# Patient Record
Sex: FEMALE | Race: BLACK OR AFRICAN AMERICAN | NOT HISPANIC OR LATINO | Employment: FULL TIME | URBAN - METROPOLITAN AREA
[De-identification: names, ages, dates, MRNs, and addresses within clinical notes are randomized per-mention and may not be internally consistent; named-entity substitution may affect disease eponyms.]

---

## 2017-05-06 ENCOUNTER — OFFICE VISIT (OUTPATIENT)
Dept: URGENT CARE | Facility: PHYSICIAN GROUP | Age: 32
End: 2017-05-06
Payer: COMMERCIAL

## 2017-05-06 VITALS
TEMPERATURE: 99 F | BODY MASS INDEX: 23.33 KG/M2 | SYSTOLIC BLOOD PRESSURE: 108 MMHG | OXYGEN SATURATION: 97 % | HEART RATE: 85 BPM | DIASTOLIC BLOOD PRESSURE: 78 MMHG | WEIGHT: 149 LBS | RESPIRATION RATE: 14 BRPM

## 2017-05-06 DIAGNOSIS — H92.02 OTALGIA, LEFT EAR: ICD-10-CM

## 2017-05-06 DIAGNOSIS — H65.192 OTHER ACUTE NONSUPPURATIVE OTITIS MEDIA OF LEFT EAR, RECURRENCE NOT SPECIFIED: ICD-10-CM

## 2017-05-06 DIAGNOSIS — J06.9 VIRAL URI WITH COUGH: ICD-10-CM

## 2017-05-06 PROCEDURE — 99203 OFFICE O/P NEW LOW 30 MIN: CPT | Performed by: NURSE PRACTITIONER

## 2017-05-06 RX ORDER — NORGESTREL AND ETHINYL ESTRADIOL 0.3-0.03MG
KIT ORAL
COMMUNITY
Start: 2017-05-05 | End: 2021-09-30

## 2017-05-06 RX ORDER — AZITHROMYCIN 250 MG/1
TABLET, FILM COATED ORAL
Qty: 6 TAB | Refills: 0 | Status: SHIPPED | OUTPATIENT
Start: 2017-05-06 | End: 2017-10-18

## 2017-05-06 ASSESSMENT — ENCOUNTER SYMPTOMS
RHINORRHEA: 1
CHILLS: 1
SWOLLEN GLANDS: 1
SPUTUM PRODUCTION: 0
COUGH: 1
SINUS PAIN: 1
FEVER: 0
WHEEZING: 0
HEADACHES: 1
SORE THROAT: 1
SHORTNESS OF BREATH: 0

## 2017-05-06 NOTE — PROGRESS NOTES
Subjective:      Chely Pizarro is a 31 y.o. female who presents with Nasal Congestion            URI   This is a new problem. Episode onset: 3 days ago. The problem has been gradually worsening. There has been no fever. Associated symptoms include congestion, coughing, ear pain, headaches, a plugged ear sensation, rhinorrhea, sinus pain, sneezing, a sore throat and swollen glands. Pertinent negatives include no wheezing. She has tried acetaminophen, NSAIDs and sleep for the symptoms. The treatment provided no relief.       Review of Systems   Constitutional: Positive for chills and malaise/fatigue. Negative for fever.   HENT: Positive for congestion, ear pain, rhinorrhea, sneezing and sore throat. Negative for ear discharge.    Respiratory: Positive for cough. Negative for sputum production, shortness of breath and wheezing.    Neurological: Positive for headaches.   All other systems reviewed and are negative.    No past medical history on file.   Past Surgical History   Procedure Laterality Date   • Gyn surgery       labiaplasty      Social History     Social History   • Marital Status:      Spouse Name: N/A   • Number of Children: N/A   • Years of Education: N/A     Occupational History   • Not on file.     Social History Main Topics   • Smoking status: Never Smoker    • Smokeless tobacco: Not on file   • Alcohol Use: No   • Drug Use: No   • Sexual Activity: Not on file     Other Topics Concern   • Not on file     Social History Narrative   • No narrative on file          Objective:     /78 mmHg  Pulse 85  Temp(Src) 37.2 °C (99 °F)  Resp 14  Wt 67.586 kg (149 lb)  SpO2 97%     Physical Exam   Constitutional: She is oriented to person, place, and time. Vital signs are normal. She appears well-developed and well-nourished.   HENT:   Head: Normocephalic and atraumatic.   Right Ear: External ear normal. Tympanic membrane is erythematous (mild).   Left Ear: External ear normal. Tympanic  membrane is injected and erythematous. Tympanic membrane is not perforated.  No middle ear effusion.   Nose: Mucosal edema and rhinorrhea present. Right sinus exhibits frontal sinus tenderness. Left sinus exhibits frontal sinus tenderness.   Mouth/Throat: Oropharynx is clear and moist.   Eyes: EOM are normal. Pupils are equal, round, and reactive to light.   Neck: Trachea normal, normal range of motion and phonation normal.   Cardiovascular: Normal rate and regular rhythm.    Pulmonary/Chest: Effort normal and breath sounds normal.   Musculoskeletal: Normal range of motion.   Lymphadenopathy:        Head (right side): Submandibular adenopathy present.        Head (left side): Submandibular adenopathy present.   Neurological: She is alert and oriented to person, place, and time.   Skin: Skin is warm and dry.   Psychiatric: She has a normal mood and affect. Her speech is normal and behavior is normal. Thought content normal.   Vitals reviewed.              Assessment/Plan:     1. Viral URI with cough    2. Otalgia, left ear  3. Other acute nonsuppurative otitis media of left ear, recurrence not specified  - azithromycin (ZITHROMAX) 250 MG Tab; Take 2 tabs PO on the first day, then one tab PO every day for 4 days  Dispense: 6 Tab; Refill: 0    OTC nasal decongestant  High volume nasal saline rinses  Increase water intake  Tylenol and Ibuprofen PRN fever/pain  Supportive care, differential diagnoses, and indications for immediate follow-up discussed with patient.    Pathogenesis of diagnosis discussed including typical length and natural progression.      Instructed to return to  or nearest emergency department if symptoms fail to improve, for any change in condition, further concerns, or new concerning symptoms.  Patient states understanding of the plan of care and discharge instructions.

## 2017-05-06 NOTE — MR AVS SNAPSHOT
Chely Pizarro   2017 9:45 AM   Office Visit   MRN: [id number]    Department:  [facility]   Dept Phone:  [phone]    Description:  Female : 1985   Provider:  ISABEL Hudson           Reason for Visit     Nasal Congestion congestion, chills, bilateral ear pain x3 days      Allergies as of 2017     Allergen Noted Reactions    Pcn [Penicillins] 2012   Rash      You were diagnosed with     Viral URI with cough   [[id number]]       Otalgia, left ear   [[id number]]       Other acute nonsuppurative otitis media of left ear, recurrence not specified   [[id number]]         Vital Signs     Blood Pressure Pulse Temperature Respirations Weight Oxygen Saturation    108/78 mmHg 85 37.2 °C (99 °F) 14 67.586 kg (149 lb) 97%    Smoking Status                   Never Smoker            Basic Information     Date Of Birth Sex Race Ethnicity Preferred Language    1985 Female Black or  Non- English      Problem List              ICD-10-CM Priority Class Noted - Resolved    Active labor [id number]   3/23/2014 - Present      Health Maintenance        Date Due Completion Dates    PAP SMEAR 2005 ---    IMM DTaP/Tdap/Td Vaccine (2 - Td) 3/23/2024 3/23/2014            Current Immunizations     Tdap Vaccine 3/23/2014  2:45 PM, 3/23/2014  3:03 PM      Below and/or attached are the medications your provider expects you to take. Review all of your home medications and newly ordered medications with your provider and/or pharmacist. Follow medication instructions as directed by your provider and/or pharmacist. Please keep your medication list with you and share with your provider. Update the information when medications are discontinued, doses are changed, or new medications (including over-the-counter products) are added; and carry medication information at all times in the event of emergency situations     Allergies:  PCN - Rash               Medications  Valid as  of: May 06, 2017 - 10:39 AM    Generic Name Brand Name Tablet Size Instructions for use    Azithromycin (Tab) ZITHROMAX 250 MG Take 2 tabs PO on the first day, then one tab PO every day for 4 days        Ketoconazole (Cream) NIZORAL 2 % Apply to affected skin areas bid        Norgestrel-Ethinyl Estradiol (Tab) LOW-OGESTREL 0.3-30 MG-MCG         PredniSONE (Tab) DELTASONE 10 MG Start with 40mg today (4tabs), then taper as directed.        Prenatal Vit-Fe Fumarate-FA (Tab) PRENATAL S 27-0.8 MG Take 1 Tab by mouth every morning.        .                 Medicines prescribed today were sent to:     [facility] [address] [place], [place] - [address] AT [facility] [address] & [address]    [address] [place] [address]    Phone: [phone] Fax: [phone]    Open 24 Hours?: No      Medication refill instructions:       If your prescription bottle indicates you have medication refills left, it is not necessary to call your provider’s office. Please contact your pharmacy and they will refill your medication.    If your prescription bottle indicates you do not have any refills left, you may request refills at any time through one of the following ways: The online [employer] system (except Urgent Care), by calling your provider’s office, or by asking your pharmacy to contact your provider’s office with a refill request. Medication refills are processed only during regular business hours and may not be available until the next business day. Your provider may request additional information or to have a follow-up visit with you prior to refilling your medication.   *Please Note: Medication refills are assigned a new Rx number when refilled electronically. Your pharmacy may indicate that no refills were authorized even though a new prescription for the same medication is available at the pharmacy. Please request the medicine by name with the pharmacy before contacting your provider for a refill.            Take5 Access Code: [email or web address]  Expires: 6/5/2017  9:35 AM    Take5  A secure, online tool to manage your health information     [employer]’s Take5® is a secure, online tool that connects you to your personalized health information from the privacy of your home -- day or night - making it very easy for you to manage your healthcare. Once the activation process is completed, you can even access your medical information using the Take5 daryl, which is available for free in the Apple Daryl store or Google Play store.     Take5 provides the following levels of access (as shown below):   My Chart Features   [facility] Primary Care Doctor [facility]  Specialists [facility]  Urgent  Care Non-[facility]  Primary Care  Doctor   Email your healthcare team securely and privately 24/7 X X X    Manage appointments: schedule your next appointment; view details of past/upcoming appointments X      Request prescription refills. X      View recent personal medical records, including lab and immunizations X X X X   View health record, including health history, allergies, medications X X X X   Read reports about your outpatient visits, procedures, consult and ER notes X X X X   See your discharge summary, which is a recap of your hospital and/or ER visit that includes your diagnosis, lab results, and care plan. X X       How to register for Take5:  1. Go to  [email or web address].  2. Click on the Sign Up Now box, which takes you to the New Member Sign Up page. You will need to provide the following information:  a. Enter your Take5 Access Code exactly as it appears at the top of this page. (You will not need to use this code after you’ve completed the sign-up process. If you do not sign up before the expiration date, you must request a new code.)   b. Enter your date of birth.   c. Enter your home email address.   d. Click Submit, and follow the next screen’s instructions.  3. Create a Take5 ID. This will be your Take5  login ID and cannot be changed, so think of one that is secure and easy to remember.  4. Create a [employer] password. You can change your password at any time.  5. Enter your Password Reset Question and Answer. This can be used at a later time if you forget your password.   6. Enter your e-mail address. This allows you to receive e-mail notifications when new information is available in [employer].  7. Click Sign Up. You can now view your health information.    For assistance activating your [employer] account, call [phone]

## 2017-08-23 ENCOUNTER — APPOINTMENT (OUTPATIENT)
Dept: RADIOLOGY | Facility: MEDICAL CENTER | Age: 32
End: 2017-08-23
Attending: SPECIALIST
Payer: COMMERCIAL

## 2017-08-23 DIAGNOSIS — M54.2 CERVICALGIA: ICD-10-CM

## 2017-08-23 DIAGNOSIS — R20.2 PARESTHESIAS: ICD-10-CM

## 2017-08-23 PROCEDURE — 72141 MRI NECK SPINE W/O DYE: CPT

## 2017-09-22 ENCOUNTER — HOSPITAL ENCOUNTER (OUTPATIENT)
Dept: RADIOLOGY | Facility: MEDICAL CENTER | Age: 32
End: 2017-09-22
Attending: SPECIALIST
Payer: COMMERCIAL

## 2017-09-22 DIAGNOSIS — M54.2 CERVICALGIA: ICD-10-CM

## 2017-09-22 DIAGNOSIS — R20.2 PARESTHESIA: ICD-10-CM

## 2017-09-22 PROCEDURE — 72125 CT NECK SPINE W/O DYE: CPT

## 2017-10-18 ENCOUNTER — OFFICE VISIT (OUTPATIENT)
Dept: MEDICAL GROUP | Facility: PHYSICIAN GROUP | Age: 32
End: 2017-10-18
Payer: COMMERCIAL

## 2017-10-18 VITALS
DIASTOLIC BLOOD PRESSURE: 80 MMHG | WEIGHT: 161.8 LBS | TEMPERATURE: 98.6 F | HEIGHT: 66 IN | BODY MASS INDEX: 26 KG/M2 | OXYGEN SATURATION: 99 % | RESPIRATION RATE: 14 BRPM | SYSTOLIC BLOOD PRESSURE: 112 MMHG | HEART RATE: 78 BPM

## 2017-10-18 DIAGNOSIS — M54.2 NECK PAIN: ICD-10-CM

## 2017-10-18 DIAGNOSIS — R53.83 OTHER FATIGUE: ICD-10-CM

## 2017-10-18 DIAGNOSIS — Z13.6 SCREENING FOR CARDIOVASCULAR, RESPIRATORY, AND GENITOURINARY DISEASES: ICD-10-CM

## 2017-10-18 DIAGNOSIS — Z13.89 SCREENING FOR CARDIOVASCULAR, RESPIRATORY, AND GENITOURINARY DISEASES: ICD-10-CM

## 2017-10-18 DIAGNOSIS — Z13.83 SCREENING FOR CARDIOVASCULAR, RESPIRATORY, AND GENITOURINARY DISEASES: ICD-10-CM

## 2017-10-18 PROCEDURE — 99204 OFFICE O/P NEW MOD 45 MIN: CPT | Performed by: NURSE PRACTITIONER

## 2017-10-18 ASSESSMENT — PATIENT HEALTH QUESTIONNAIRE - PHQ9: CLINICAL INTERPRETATION OF PHQ2 SCORE: 0

## 2017-10-18 NOTE — PROGRESS NOTES
Chief Complaint   Patient presents with   • Establish Care   • Results     MRI/CT    • Orders Needed     Thyroid Labs        HPI:    Chely Pizarro is a 31 y.o. female here to establish care and to discuss the following:    Neck pain  Patient reports neck pain for at least 6 months predominantly on the right side with numbness in the right ear. She's previously been seen by ENT who ordered a CT scan and MRI which are inconsistent. MRI reveals several bulging disks with no foraminal stenosis. She is requesting further evaluation by neurology.    Fatigue  Patient is requesting to check her thyroid because she has fatigue and neck pain.She  denies heart palpitations, weight gain, cold intolerance, depression, dry skin, hair loss, constipation, weakness, headache, lethargy or panic .      Current medicines (including changes today)  Current Outpatient Prescriptions   Medication Sig Dispense Refill   • LOW-OGESTREL 0.3-30 MG-MCG Tab        No current facility-administered medications for this visit.      She  has no past medical history on file.  She  has a past surgical history that includes gyn surgery.  Social History   Substance Use Topics   • Smoking status: Never Smoker   • Smokeless tobacco: Never Used   • Alcohol use 2.4 oz/week     4 Glasses of wine per week     Social History     Social History Narrative   • No narrative on file     History reviewed. No pertinent family history.  No family status information on file.         ROS    Review of Systems   Constitutional: Negative for fever, chills, weight loss and malaise/fatigue.   HENT: Negative for ear pain, nosebleeds, congestion, sore throat.    Eyes: Negative for blurred vision.   Respiratory: Negative for cough, sputum production, shortness of breath and wheezing.    Cardiovascular: Negative for chest pain, palpitations,  and leg swelling.   Gastrointestinal: Negative for heartburn, nausea, vomiting, diarrhea and abdominal pain.   Genitourinary:  "Negative for dysuria, urgency and frequency.   Musculoskeletal: Negative for myalgias, back pain and joint pain. positive for neck pain  Skin: Negative for rash and itching.   Neurological: Negative for dizziness, tingling, tremors, sensory change, focal weakness and headaches.   Endo/Heme/Allergies: Does not bruise/bleed easily.   Psychiatric/Behavioral: Negative for depression, anxiety, suicidal ideas, insomnia and memory loss.      All other systems reviewed and are negative except as in HPI.    Health Maintenance:  Pap smear: due for pap  Immunizations:           Objective:     Blood pressure 112/80, pulse 78, temperature 37 °C (98.6 °F), resp. rate 14, height 1.676 m (5' 6\"), weight 73.4 kg (161 lb 12.8 oz), last menstrual period 10/12/2017, SpO2 99 %, not currently breastfeeding. Body mass index is 26.12 kg/m².  Physical Exam:  Constitutional: Alert, no distress.  Skin: Warm, dry, good turgor, no rashes in visible areas.  Eye: Equal, round and reactive, conjunctiva clear, lids normal.  ENMT: Lips without lesions, good dentition, oropharynx clear. Ear canals are clear, TMs within normal limits bilaterally.   Neck: Trachea midline, no masses, no thyromegaly. No cervical or supraclavicular lymphadenopathy.  Respiratory: Unlabored respiratory effort, lungs clear to auscultation, no wheezes, no ronchi.  Cardiovascular: Normal S1, S2, no murmur, no edema.  Abdomen: Soft, non-tender, no masses, no hepatosplenomegaly.  Psych: Alert and oriented x3, normal affect and mood.  MS: Ambulates independently with steady gait.  Has full range of motion of all extremities and spine.  Neuro: Cranial nerves intact. DTRs within normal limits. No neurological deficits.    Assessment and Plan:   The following treatment plan was discussed   1. Neck pain  REFERRAL TO NEUROSURGERY    ref to neurosurgery for further evaluation   2. Other fatigue  CBC WITH DIFFERENTIAL    TSH WITH REFLEX TO FT4    labs ordered   3. Screening for " cardiovascular, respiratory, and genitourinary diseases  COMP METABOLIC PANEL    LIPID PROFILE     labs ordered     Followup: Return in about 4 weeks (around 11/15/2017) for PAP.   Please note that this dictation was created using voice recognition software. I have made every reasonable attempt to correct obvious errors, but I expect that there are errors of grammar and possibly content that I did not discover before finalizing the note.

## 2017-10-18 NOTE — ASSESSMENT & PLAN NOTE
Patient reports neck pain for at least 6 months predominantly on the right side with numbness in the right ear. She's previously been seen by ENT who ordered a CT scan and MRI which are inconsistent. MRI reveals several bulging disks with no foraminal stenosis. She is requesting further evaluation by neurology.

## 2017-10-23 NOTE — ASSESSMENT & PLAN NOTE
Patient is requesting to check her thyroid because she has fatigue and neck pain.She  denies heart palpitations, weight gain, cold intolerance, depression, dry skin, hair loss, constipation, weakness, headache, lethargy or panic .

## 2017-11-14 ENCOUNTER — TELEPHONE (OUTPATIENT)
Dept: MEDICAL GROUP | Facility: PHYSICIAN GROUP | Age: 32
End: 2017-11-14

## 2017-11-14 ENCOUNTER — HOSPITAL ENCOUNTER (OUTPATIENT)
Facility: MEDICAL CENTER | Age: 32
End: 2017-11-14
Attending: NURSE PRACTITIONER
Payer: COMMERCIAL

## 2017-11-14 ENCOUNTER — OFFICE VISIT (OUTPATIENT)
Dept: MEDICAL GROUP | Facility: PHYSICIAN GROUP | Age: 32
End: 2017-11-14
Payer: COMMERCIAL

## 2017-11-14 VITALS
DIASTOLIC BLOOD PRESSURE: 82 MMHG | TEMPERATURE: 98.4 F | SYSTOLIC BLOOD PRESSURE: 132 MMHG | OXYGEN SATURATION: 100 % | HEART RATE: 84 BPM | WEIGHT: 161 LBS | HEIGHT: 66 IN | BODY MASS INDEX: 25.88 KG/M2 | RESPIRATION RATE: 16 BRPM

## 2017-11-14 DIAGNOSIS — Z00.00 ROUTINE HEALTH MAINTENANCE: ICD-10-CM

## 2017-11-14 DIAGNOSIS — Z12.4 ENCOUNTER FOR PAPANICOLAOU SMEAR OF CERVIX: ICD-10-CM

## 2017-11-14 DIAGNOSIS — Z34.90 PREGNANCY, UNSPECIFIED GESTATIONAL AGE: ICD-10-CM

## 2017-11-14 PROCEDURE — 87624 HPV HI-RISK TYP POOLED RSLT: CPT

## 2017-11-14 PROCEDURE — 99395 PREV VISIT EST AGE 18-39: CPT | Performed by: NURSE PRACTITIONER

## 2017-11-14 PROCEDURE — 88175 CYTOPATH C/V AUTO FLUID REDO: CPT

## 2017-11-14 RX ORDER — VITAMIN A ACETATE, BETA CAROTENE, ASCORBIC ACID, CHOLECALCIFEROL, .ALPHA.-TOCOPHEROL ACETATE, DL-, THIAMINE MONONITRATE, RIBOFLAVIN, NIACINAMIDE, PYRIDOXINE HYDROCHLORIDE, FOLIC ACID, CYANOCOBALAMIN, CALCIUM CARBONATE, FERROUS FUMARATE, ZINC OXIDE, CUPRIC OXIDE 3080; 12; 120; 400; 1; 1.84; 3; 20; 22; 920; 25; 200; 27; 10; 2 [IU]/1; UG/1; MG/1; [IU]/1; MG/1; MG/1; MG/1; MG/1; MG/1; [IU]/1; MG/1; MG/1; MG/1; MG/1; MG/1
1 TABLET, FILM COATED ORAL EVERY MORNING
Qty: 90 TAB | Refills: 3 | Status: CANCELLED | OUTPATIENT
Start: 2017-11-14

## 2017-11-14 RX ORDER — VITAMIN A ACETATE, BETA CAROTENE, ASCORBIC ACID, CHOLECALCIFEROL, .ALPHA.-TOCOPHEROL ACETATE, DL-, THIAMINE MONONITRATE, RIBOFLAVIN, NIACINAMIDE, PYRIDOXINE HYDROCHLORIDE, FOLIC ACID, CYANOCOBALAMIN, CALCIUM CARBONATE, FERROUS FUMARATE, ZINC OXIDE, CUPRIC OXIDE 3080; 12; 120; 400; 1; 1.84; 3; 20; 22; 920; 25; 200; 27; 10; 2 [IU]/1; UG/1; MG/1; [IU]/1; MG/1; MG/1; MG/1; MG/1; MG/1; [IU]/1; MG/1; MG/1; MG/1; MG/1; MG/1
1 TABLET, FILM COATED ORAL EVERY MORNING
Qty: 90 TAB | Refills: 3 | Status: SHIPPED | OUTPATIENT
Start: 2017-11-14 | End: 2020-02-15

## 2017-11-14 NOTE — TELEPHONE ENCOUNTER
1. Caller Name: Chely Pizarro                                           Call Back Number: [phone] (home)         Patient approves a detailed voicemail message: N\A    Pt states she was just seen and found out she is pregnant again.  She states with 1st 2 pregnancies she had to use progesterone suppositories for the 1 trimester and is asking ir rx can be sent to pharmacy.  Not in med list.

## 2017-11-14 NOTE — PROGRESS NOTES
"SUBJECTIVE: 32 y.o. female for annual routine gynecologic exam  Chief Complaint   Patient presents with   • Gynecologic Exam       Obstetric History     No data available   LMP 10/12/2017  Last Pap:   History   Sexual Activity   • Sexual activity: Yes   • Partners: Male   • Birth control/ protection: Pill     Sexual history: currently sexually active   H/O Abnormal Pap yes in 2007 Abnormal  With HPV  She  reports that she has never smoked. She has never used smokeless tobacco.        Allergies: Pcn [penicillins]     ROS:    Menses every month with irregular PCOS  No significant bloating/fluid retention, pelvic pain, or dyspareunia. No vaginal discharge   No breast tenderness, mass, nipple discharge, changes in size or contour, or abnormal cyclic discomfort.  No urinary tract symptoms, no incontinence, no polydipsia, polyuria,  No abdominal pain, change in bowel habits, black or bloody stools.    No unusual headaches, no visual changes, menstrual migraines   No prolonged cough. No dyspnea or chest pain on exertion.  No depression, labile mood, anxiety, libido changes, insomnia.  No temperature intolerance.  No new/concerning skin lesions, concerns.     Exercise: no regular exercise program  Preventive Care Influenza vaccine    Current medicines (including changes today)  Current Outpatient Prescriptions   Medication Sig Dispense Refill   • PANTOPRAZOLE SODIUM PO Take  by mouth.     • prenatal plus vitamin (STUARTNATAL 1+1) 27-1 MG Tab tablet Take 1 Tab by mouth every morning. 90 Tab 3   • LOW-OGESTREL 0.3-30 MG-MCG Tab        No current facility-administered medications for this visit.      She  has no past medical history on file.  She  has a past surgical history that includes gyn surgery.     Family History: History reviewed. No pertinent family history.    OBJECTIVE:   /82   Pulse 84   Temp 36.9 °C (98.4 °F)   Resp 16   Ht 1.676 m (5' 6\")   Wt 73 kg (161 lb)   SpO2 100%   Breastfeeding? No   BMI " 25.99 kg/m²   Body mass index is 25.99 kg/m².    HEAD AND NECK:  Ears normal.  Throat, oral cavity and tongue normal.  Neck supple. No adenopathy or masses in the neck or supraclavicular regions.  No carotid bruits. No thyromegaly. NEURO: Cranial nerves are normal. DTR's normal and symmetric.  CHEST:  Clear, good air entry, no wheezes or rales. HEART:  Regular rate and rhythm.  S1 and S2 normal.   No edema or JVD. ABDOMEN:  Soft without tenderness, guarding, mass or organomegaly.  No CVA tenderness or inguinal adenopathy. EXTREMITIES:  Extremities, reflexes and peripheral pulses are normal. SKIN: color normal, vascularity normal, no edema, temperature normal   No rashes or suspicious skin lesions noted.     Breast Exam: Performed with instruction during examination. No axillary lymphadenopathy, no skin changes, no dominant masses. No nipple retraction, tenderness to palpation bilaterally  Pelvic Exam -  Normal external genitalia with no lesions. Normal vaginal mucosa with normal rugation and no discharge. Cervix with no visible lesions. No cervical motion tenderness. Uterus is normal sized with no masses. No adnexal tenderness or enlargement appreciated. Thin Prep Pap is obtained, vaginal swab is not obtained and specimen(s) sent to lab  Rectal: deferred    <ASSESSMENT and PLAN>  1. Pregnancy, unspecified gestational age  REFERRAL TO OB/GYN    prenatal plus vitamin (STUARTNATAL 1+1) 27-1 MG Tab tablet    POCT Pregnancy   2. Routine health maintenance     3. Encounter for Papanicolaou smear of cervix  THINPREP PAP WITH HPV    Urine pregnancy is positive    Discussed  family planning choices   Follow-up in 1 years for next Gyn exam and 3 years for next Pap.   Next office visit for recheck of chronic medical conditions is due as needed

## 2017-11-15 DIAGNOSIS — Z34.90 PREGNANCY, UNSPECIFIED GESTATIONAL AGE: ICD-10-CM

## 2017-11-15 LAB
CYTOLOGY REG CYTOL: NORMAL
HPV HR 12 DNA CVX QL NAA+PROBE: NEGATIVE
HPV16 DNA SPEC QL NAA+PROBE: NEGATIVE
HPV18 DNA SPEC QL NAA+PROBE: NEGATIVE
SPECIMEN SOURCE: NORMAL

## 2017-11-15 NOTE — TELEPHONE ENCOUNTER
Pt states she has had a past miscarriage; due to the fact she had POCS with her pregnancies and does not make the correct progesterone hormone. She has to take the suppositories for the first trimester.  Please Advise

## 2017-11-16 ENCOUNTER — TELEPHONE (OUTPATIENT)
Dept: MEDICAL GROUP | Facility: PHYSICIAN GROUP | Age: 32
End: 2017-11-16

## 2017-11-16 NOTE — TELEPHONE ENCOUNTER
----- Message from ISABEL Buchanan sent at 11/15/2017  9:00 PM PST -----  Please call the patient and tell them their Pap smear results are normal  This means that no cancerous or precancerous cells were seen.  It is recommended that they get an annual well woman exam in one year and a routine Pap smear in 3 years.

## 2018-01-15 ENCOUNTER — HOSPITAL ENCOUNTER (OUTPATIENT)
Dept: LAB | Facility: MEDICAL CENTER | Age: 33
End: 2018-01-15
Attending: OBSTETRICS & GYNECOLOGY
Payer: COMMERCIAL

## 2018-01-15 LAB
APPEARANCE UR: CLEAR
BASOPHILS # BLD AUTO: 0.3 % (ref 0–1.8)
BASOPHILS # BLD: 0.02 K/UL (ref 0–0.12)
BILIRUB UR QL STRIP.AUTO: NEGATIVE
COLOR UR: YELLOW
CULTURE IF INDICATED INDCX: NO UA CULTURE
EOSINOPHIL # BLD AUTO: 0.03 K/UL (ref 0–0.51)
EOSINOPHIL NFR BLD: 0.4 % (ref 0–6.9)
ERYTHROCYTE [DISTWIDTH] IN BLOOD BY AUTOMATED COUNT: 37.4 FL (ref 35.9–50)
GLUCOSE UR STRIP.AUTO-MCNC: NEGATIVE MG/DL
HBV SURFACE AG SER QL: NEGATIVE
HCT VFR BLD AUTO: 39.6 % (ref 37–47)
HGB BLD-MCNC: 13.9 G/DL (ref 12–16)
HIV 1+2 AB+HIV1 P24 AG SERPL QL IA: NON REACTIVE
IMM GRANULOCYTES # BLD AUTO: 0.02 K/UL (ref 0–0.11)
IMM GRANULOCYTES NFR BLD AUTO: 0.3 % (ref 0–0.9)
KETONES UR STRIP.AUTO-MCNC: NEGATIVE MG/DL
LEUKOCYTE ESTERASE UR QL STRIP.AUTO: NEGATIVE
LYMPHOCYTES # BLD AUTO: 1.54 K/UL (ref 1–4.8)
LYMPHOCYTES NFR BLD: 21.6 % (ref 22–41)
MCH RBC QN AUTO: 31.1 PG (ref 27–33)
MCHC RBC AUTO-ENTMCNC: 35.1 G/DL (ref 33.6–35)
MCV RBC AUTO: 88.6 FL (ref 81.4–97.8)
MICRO URNS: NORMAL
MONOCYTES # BLD AUTO: 0.35 K/UL (ref 0–0.85)
MONOCYTES NFR BLD AUTO: 4.9 % (ref 0–13.4)
NEUTROPHILS # BLD AUTO: 5.17 K/UL (ref 2–7.15)
NEUTROPHILS NFR BLD: 72.5 % (ref 44–72)
NITRITE UR QL STRIP.AUTO: NEGATIVE
NRBC # BLD AUTO: 0 K/UL
NRBC BLD-RTO: 0 /100 WBC
PH UR STRIP.AUTO: 5.5 [PH]
PLATELET # BLD AUTO: 226 K/UL (ref 164–446)
PMV BLD AUTO: 10.8 FL (ref 9–12.9)
PROT UR QL STRIP: NEGATIVE MG/DL
RBC # BLD AUTO: 4.47 M/UL (ref 4.2–5.4)
RBC UR QL AUTO: NEGATIVE
RUBV AB SER QL: 41 IU/ML
SP GR UR STRIP.AUTO: 1.02
TREPONEMA PALLIDUM IGG+IGM AB [PRESENCE] IN SERUM OR PLASMA BY IMMUNOASSAY: NON REACTIVE
UROBILINOGEN UR STRIP.AUTO-MCNC: 0.2 MG/DL
WBC # BLD AUTO: 7.1 K/UL (ref 4.8–10.8)

## 2018-01-15 PROCEDURE — 86900 BLOOD TYPING SEROLOGIC ABO: CPT

## 2018-01-15 PROCEDURE — 87340 HEPATITIS B SURFACE AG IA: CPT

## 2018-01-15 PROCEDURE — 87389 HIV-1 AG W/HIV-1&-2 AB AG IA: CPT

## 2018-01-15 PROCEDURE — 86762 RUBELLA ANTIBODY: CPT

## 2018-01-15 PROCEDURE — 86850 RBC ANTIBODY SCREEN: CPT

## 2018-01-15 PROCEDURE — 86901 BLOOD TYPING SEROLOGIC RH(D): CPT

## 2018-01-15 PROCEDURE — 81003 URINALYSIS AUTO W/O SCOPE: CPT

## 2018-01-15 PROCEDURE — 81508 FTL CGEN ABNOR TWO PROTEINS: CPT

## 2018-01-15 PROCEDURE — 36415 COLL VENOUS BLD VENIPUNCTURE: CPT

## 2018-01-15 PROCEDURE — 86780 TREPONEMA PALLIDUM: CPT

## 2018-01-15 PROCEDURE — 85025 COMPLETE CBC W/AUTO DIFF WBC: CPT

## 2018-01-16 LAB
ABO GROUP BLD: NORMAL
BLD GP AB SCN SERPL QL: NORMAL
RH BLD: NORMAL

## 2018-01-16 PROCEDURE — 86901 BLOOD TYPING SEROLOGIC RH(D): CPT

## 2018-01-16 PROCEDURE — 86900 BLOOD TYPING SEROLOGIC ABO: CPT

## 2018-01-16 PROCEDURE — 86850 RBC ANTIBODY SCREEN: CPT

## 2018-01-17 LAB — TEST NAME 95000: NORMAL

## 2018-02-10 ENCOUNTER — HOSPITAL ENCOUNTER (OUTPATIENT)
Dept: LAB | Facility: MEDICAL CENTER | Age: 33
End: 2018-02-10
Attending: OBSTETRICS & GYNECOLOGY
Payer: COMMERCIAL

## 2018-02-10 PROCEDURE — 82105 ALPHA-FETOPROTEIN SERUM: CPT

## 2018-02-10 PROCEDURE — 36415 COLL VENOUS BLD VENIPUNCTURE: CPT

## 2018-02-13 LAB
# FETUSES US: NORMAL
AFP MOM SERPL: 0.73
AFP SERPL-MCNC: 29 NG/ML
AGE - REPORTED: 33.6 YR
GA METHOD: NORMAL
GA: 17.29 WEEKS
IDDM PATIENT QL: NO
INTEGRATED SCN PATIENT-IMP: NORMAL

## 2018-05-09 ENCOUNTER — HOSPITAL ENCOUNTER (OUTPATIENT)
Dept: LAB | Facility: MEDICAL CENTER | Age: 33
End: 2018-05-09
Attending: NURSE PRACTITIONER
Payer: COMMERCIAL

## 2018-05-09 ENCOUNTER — HOSPITAL ENCOUNTER (OUTPATIENT)
Dept: LAB | Facility: MEDICAL CENTER | Age: 33
End: 2018-05-09
Attending: OBSTETRICS & GYNECOLOGY
Payer: COMMERCIAL

## 2018-05-09 DIAGNOSIS — Z13.6 SCREENING FOR CARDIOVASCULAR, RESPIRATORY, AND GENITOURINARY DISEASES: ICD-10-CM

## 2018-05-09 DIAGNOSIS — Z13.89 SCREENING FOR CARDIOVASCULAR, RESPIRATORY, AND GENITOURINARY DISEASES: ICD-10-CM

## 2018-05-09 DIAGNOSIS — Z13.83 SCREENING FOR CARDIOVASCULAR, RESPIRATORY, AND GENITOURINARY DISEASES: ICD-10-CM

## 2018-05-09 DIAGNOSIS — R53.83 OTHER FATIGUE: ICD-10-CM

## 2018-05-09 LAB
ALBUMIN SERPL BCP-MCNC: 3.4 G/DL (ref 3.2–4.9)
ALBUMIN/GLOB SERPL: 1.2 G/DL
ALP SERPL-CCNC: 56 U/L (ref 30–99)
ALT SERPL-CCNC: 9 U/L (ref 2–50)
ANION GAP SERPL CALC-SCNC: 7 MMOL/L (ref 0–11.9)
AST SERPL-CCNC: 13 U/L (ref 12–45)
BASOPHILS # BLD AUTO: 0.4 % (ref 0–1.8)
BASOPHILS # BLD: 0.03 K/UL (ref 0–0.12)
BILIRUB SERPL-MCNC: 0.3 MG/DL (ref 0.1–1.5)
BUN SERPL-MCNC: 6 MG/DL (ref 8–22)
CALCIUM SERPL-MCNC: 8.7 MG/DL (ref 8.5–10.5)
CHLORIDE SERPL-SCNC: 107 MMOL/L (ref 96–112)
CHOLEST SERPL-MCNC: 232 MG/DL (ref 100–199)
CO2 SERPL-SCNC: 22 MMOL/L (ref 20–33)
CREAT SERPL-MCNC: 0.53 MG/DL (ref 0.5–1.4)
EOSINOPHIL # BLD AUTO: 0.02 K/UL (ref 0–0.51)
EOSINOPHIL NFR BLD: 0.2 % (ref 0–6.9)
ERYTHROCYTE [DISTWIDTH] IN BLOOD BY AUTOMATED COUNT: 41.8 FL (ref 35.9–50)
GLOBULIN SER CALC-MCNC: 2.9 G/DL (ref 1.9–3.5)
GLUCOSE 1H P 50 G GLC PO SERPL-MCNC: 120 MG/DL (ref 70–139)
GLUCOSE SERPL-MCNC: 73 MG/DL (ref 65–99)
HCT VFR BLD AUTO: 37.6 % (ref 37–47)
HDLC SERPL-MCNC: 60 MG/DL
HGB BLD-MCNC: 12.7 G/DL (ref 12–16)
IMM GRANULOCYTES # BLD AUTO: 0.06 K/UL (ref 0–0.11)
IMM GRANULOCYTES NFR BLD AUTO: 0.7 % (ref 0–0.9)
LDLC SERPL CALC-MCNC: 123 MG/DL
LYMPHOCYTES # BLD AUTO: 1.69 K/UL (ref 1–4.8)
LYMPHOCYTES NFR BLD: 20.6 % (ref 22–41)
MCH RBC QN AUTO: 30.4 PG (ref 27–33)
MCHC RBC AUTO-ENTMCNC: 33.8 G/DL (ref 33.6–35)
MCV RBC AUTO: 90 FL (ref 81.4–97.8)
MONOCYTES # BLD AUTO: 0.44 K/UL (ref 0–0.85)
MONOCYTES NFR BLD AUTO: 5.4 % (ref 0–13.4)
NEUTROPHILS # BLD AUTO: 5.96 K/UL (ref 2–7.15)
NEUTROPHILS NFR BLD: 72.7 % (ref 44–72)
NRBC # BLD AUTO: 0 K/UL
NRBC BLD-RTO: 0 /100 WBC
PLATELET # BLD AUTO: 174 K/UL (ref 164–446)
PMV BLD AUTO: 10.2 FL (ref 9–12.9)
POTASSIUM SERPL-SCNC: 3.8 MMOL/L (ref 3.6–5.5)
PROT SERPL-MCNC: 6.3 G/DL (ref 6–8.2)
RBC # BLD AUTO: 4.18 M/UL (ref 4.2–5.4)
SODIUM SERPL-SCNC: 136 MMOL/L (ref 135–145)
TRIGL SERPL-MCNC: 243 MG/DL (ref 0–149)
TSH SERPL DL<=0.005 MIU/L-ACNC: 1.03 UIU/ML (ref 0.38–5.33)
WBC # BLD AUTO: 8.2 K/UL (ref 4.8–10.8)

## 2018-05-09 PROCEDURE — 36415 COLL VENOUS BLD VENIPUNCTURE: CPT

## 2018-05-09 PROCEDURE — 82950 GLUCOSE TEST: CPT

## 2018-05-09 PROCEDURE — 85025 COMPLETE CBC W/AUTO DIFF WBC: CPT

## 2018-05-09 PROCEDURE — 84443 ASSAY THYROID STIM HORMONE: CPT

## 2018-05-09 PROCEDURE — 80053 COMPREHEN METABOLIC PANEL: CPT

## 2018-05-09 PROCEDURE — 80061 LIPID PANEL: CPT

## 2018-05-10 ENCOUNTER — TELEPHONE (OUTPATIENT)
Dept: MEDICAL GROUP | Facility: PHYSICIAN GROUP | Age: 33
End: 2018-05-10

## 2018-05-10 NOTE — TELEPHONE ENCOUNTER
----- Message from LINDA Ludwig sent at 5/10/2018 12:27 PM PDT -----  Please call patient and let her know that she needs to schedule an appointment to go over her lab results.  It also looks like she needs to establish care with another provider...    Thank you!  Arturo

## 2018-07-10 ENCOUNTER — HOSPITAL ENCOUNTER (INPATIENT)
Facility: MEDICAL CENTER | Age: 33
LOS: 2 days | End: 2018-07-12
Attending: OBSTETRICS & GYNECOLOGY | Admitting: OBSTETRICS & GYNECOLOGY
Payer: COMMERCIAL

## 2018-07-10 LAB
APPEARANCE UR: ABNORMAL
BASOPHILS # BLD AUTO: 0.1 % (ref 0–1.8)
BASOPHILS # BLD: 0.01 K/UL (ref 0–0.12)
COLOR UR AUTO: YELLOW
EOSINOPHIL # BLD AUTO: 0.02 K/UL (ref 0–0.51)
EOSINOPHIL NFR BLD: 0.2 % (ref 0–6.9)
ERYTHROCYTE [DISTWIDTH] IN BLOOD BY AUTOMATED COUNT: 39.6 FL (ref 35.9–50)
GLUCOSE UR QL STRIP.AUTO: 500 MG/DL
HCT VFR BLD AUTO: 35.7 % (ref 37–47)
HGB BLD-MCNC: 12.5 G/DL (ref 12–16)
HOLDING TUBE BB 8507: NORMAL
IMM GRANULOCYTES # BLD AUTO: 0.05 K/UL (ref 0–0.11)
IMM GRANULOCYTES NFR BLD AUTO: 0.6 % (ref 0–0.9)
KETONES UR QL STRIP.AUTO: 15 MG/DL
LEUKOCYTE ESTERASE UR QL STRIP.AUTO: ABNORMAL
LYMPHOCYTES # BLD AUTO: 1.84 K/UL (ref 1–4.8)
LYMPHOCYTES NFR BLD: 20.5 % (ref 22–41)
MCH RBC QN AUTO: 30.3 PG (ref 27–33)
MCHC RBC AUTO-ENTMCNC: 35 G/DL (ref 33.6–35)
MCV RBC AUTO: 86.7 FL (ref 81.4–97.8)
MONOCYTES # BLD AUTO: 0.56 K/UL (ref 0–0.85)
MONOCYTES NFR BLD AUTO: 6.3 % (ref 0–13.4)
NEUTROPHILS # BLD AUTO: 6.48 K/UL (ref 2–7.15)
NEUTROPHILS NFR BLD: 72.3 % (ref 44–72)
NITRITE UR QL STRIP.AUTO: NEGATIVE
NRBC # BLD AUTO: 0 K/UL
NRBC BLD-RTO: 0 /100 WBC
PH UR STRIP.AUTO: 5.5 [PH]
PLATELET # BLD AUTO: 188 K/UL (ref 164–446)
PMV BLD AUTO: 10.9 FL (ref 9–12.9)
PROT UR QL STRIP: 30 MG/DL
RBC # BLD AUTO: 4.12 M/UL (ref 4.2–5.4)
RBC UR QL AUTO: ABNORMAL
SP GR UR: >=1.03
WBC # BLD AUTO: 9 K/UL (ref 4.8–10.8)

## 2018-07-10 PROCEDURE — 770002 HCHG ROOM/CARE - OB PRIVATE (112)

## 2018-07-10 PROCEDURE — 700105 HCHG RX REV CODE 258: Performed by: OBSTETRICS & GYNECOLOGY

## 2018-07-10 PROCEDURE — 700101 HCHG RX REV CODE 250: Performed by: OBSTETRICS & GYNECOLOGY

## 2018-07-10 PROCEDURE — 81002 URINALYSIS NONAUTO W/O SCOPE: CPT

## 2018-07-10 PROCEDURE — 85025 COMPLETE CBC W/AUTO DIFF WBC: CPT

## 2018-07-10 RX ORDER — SODIUM CHLORIDE, SODIUM LACTATE, POTASSIUM CHLORIDE, CALCIUM CHLORIDE 600; 310; 30; 20 MG/100ML; MG/100ML; MG/100ML; MG/100ML
INJECTION, SOLUTION INTRAVENOUS
Status: COMPLETED
Start: 2018-07-10 | End: 2018-07-10

## 2018-07-10 RX ORDER — TERBUTALINE SULFATE 1 MG/ML
0.25 INJECTION, SOLUTION SUBCUTANEOUS PRN
Status: DISCONTINUED | OUTPATIENT
Start: 2018-07-10 | End: 2018-07-11 | Stop reason: HOSPADM

## 2018-07-10 RX ORDER — MISOPROSTOL 200 UG/1
800 TABLET ORAL
Status: DISCONTINUED | OUTPATIENT
Start: 2018-07-10 | End: 2018-07-11 | Stop reason: HOSPADM

## 2018-07-10 RX ORDER — SODIUM CHLORIDE, SODIUM LACTATE, POTASSIUM CHLORIDE, CALCIUM CHLORIDE 600; 310; 30; 20 MG/100ML; MG/100ML; MG/100ML; MG/100ML
INJECTION, SOLUTION INTRAVENOUS CONTINUOUS
Status: DISPENSED | OUTPATIENT
Start: 2018-07-10 | End: 2018-07-11

## 2018-07-10 RX ADMIN — DINOPROSTONE 5 MG: 20 SUPPOSITORY VAGINAL at 23:34

## 2018-07-10 RX ADMIN — SODIUM CHLORIDE, POTASSIUM CHLORIDE, SODIUM LACTATE AND CALCIUM CHLORIDE: 600; 310; 30; 20 INJECTION, SOLUTION INTRAVENOUS at 23:00

## 2018-07-10 ASSESSMENT — COPD QUESTIONNAIRES
IN THE PAST 12 MONTHS DO YOU DO LESS THAN YOU USED TO BECAUSE OF YOUR BREATHING PROBLEMS: DISAGREE/UNSURE
HAVE YOU SMOKED AT LEAST 100 CIGARETTES IN YOUR ENTIRE LIFE: NO/DON'T KNOW
DO YOU EVER COUGH UP ANY MUCUS OR PHLEGM?: NO/ONLY WITH OCCASIONAL COLDS OR INFECTIONS
DURING THE PAST 4 WEEKS HOW MUCH DID YOU FEEL SHORT OF BREATH: NONE/LITTLE OF THE TIME

## 2018-07-10 ASSESSMENT — PATIENT HEALTH QUESTIONNAIRE - PHQ9
1. LITTLE INTEREST OR PLEASURE IN DOING THINGS: NOT AT ALL
SUM OF ALL RESPONSES TO PHQ9 QUESTIONS 1 AND 2: 0
2. FEELING DOWN, DEPRESSED, IRRITABLE, OR HOPELESS: NOT AT ALL

## 2018-07-10 ASSESSMENT — LIFESTYLE VARIABLES
ALCOHOL_USE: NO
EVER_SMOKED: NEVER

## 2018-07-11 LAB
ERYTHROCYTE [DISTWIDTH] IN BLOOD BY AUTOMATED COUNT: 41.2 FL (ref 35.9–50)
HCT VFR BLD AUTO: 34.8 % (ref 37–47)
HGB BLD-MCNC: 12 G/DL (ref 12–16)
MCH RBC QN AUTO: 30.5 PG (ref 27–33)
MCHC RBC AUTO-ENTMCNC: 34.5 G/DL (ref 33.6–35)
MCV RBC AUTO: 88.3 FL (ref 81.4–97.8)
PLATELET # BLD AUTO: 157 K/UL (ref 164–446)
PMV BLD AUTO: 10.8 FL (ref 9–12.9)
RBC # BLD AUTO: 3.94 M/UL (ref 4.2–5.4)
WBC # BLD AUTO: 11.1 K/UL (ref 4.8–10.8)

## 2018-07-11 PROCEDURE — 36415 COLL VENOUS BLD VENIPUNCTURE: CPT

## 2018-07-11 PROCEDURE — A9270 NON-COVERED ITEM OR SERVICE: HCPCS | Performed by: OBSTETRICS & GYNECOLOGY

## 2018-07-11 PROCEDURE — 85027 COMPLETE CBC AUTOMATED: CPT

## 2018-07-11 PROCEDURE — 700102 HCHG RX REV CODE 250 W/ 637 OVERRIDE(OP): Performed by: OBSTETRICS & GYNECOLOGY

## 2018-07-11 PROCEDURE — 303615 HCHG EPIDURAL/SPINAL ANESTHESIA FOR LABOR

## 2018-07-11 PROCEDURE — 700105 HCHG RX REV CODE 258: Performed by: OBSTETRICS & GYNECOLOGY

## 2018-07-11 PROCEDURE — 700111 HCHG RX REV CODE 636 W/ 250 OVERRIDE (IP): Performed by: ANESTHESIOLOGY

## 2018-07-11 PROCEDURE — 700111 HCHG RX REV CODE 636 W/ 250 OVERRIDE (IP): Performed by: OBSTETRICS & GYNECOLOGY

## 2018-07-11 PROCEDURE — 304965 HCHG RECOVERY SERVICES

## 2018-07-11 PROCEDURE — 3E0P7VZ INTRODUCTION OF HORMONE INTO FEMALE REPRODUCTIVE, VIA NATURAL OR ARTIFICIAL OPENING: ICD-10-PCS | Performed by: OBSTETRICS & GYNECOLOGY

## 2018-07-11 PROCEDURE — 59409 OBSTETRICAL CARE: CPT

## 2018-07-11 PROCEDURE — 10907ZC DRAINAGE OF AMNIOTIC FLUID, THERAPEUTIC FROM PRODUCTS OF CONCEPTION, VIA NATURAL OR ARTIFICIAL OPENING: ICD-10-PCS | Performed by: OBSTETRICS & GYNECOLOGY

## 2018-07-11 PROCEDURE — 770002 HCHG ROOM/CARE - OB PRIVATE (112)

## 2018-07-11 RX ORDER — ONDANSETRON 2 MG/ML
4 INJECTION INTRAMUSCULAR; INTRAVENOUS EVERY 6 HOURS PRN
Status: DISCONTINUED | OUTPATIENT
Start: 2018-07-11 | End: 2018-07-12 | Stop reason: HOSPADM

## 2018-07-11 RX ORDER — ROPIVACAINE HYDROCHLORIDE 2 MG/ML
INJECTION, SOLUTION EPIDURAL; INFILTRATION; PERINEURAL CONTINUOUS
Status: DISCONTINUED | OUTPATIENT
Start: 2018-07-11 | End: 2018-07-11

## 2018-07-11 RX ORDER — IBUPROFEN 600 MG/1
600 TABLET ORAL EVERY 6 HOURS PRN
Status: DISCONTINUED | OUTPATIENT
Start: 2018-07-11 | End: 2018-07-12 | Stop reason: HOSPADM

## 2018-07-11 RX ORDER — MISOPROSTOL 200 UG/1
600 TABLET ORAL
Status: DISCONTINUED | OUTPATIENT
Start: 2018-07-11 | End: 2018-07-12 | Stop reason: HOSPADM

## 2018-07-11 RX ORDER — ONDANSETRON 4 MG/1
4 TABLET, ORALLY DISINTEGRATING ORAL EVERY 6 HOURS PRN
Status: DISCONTINUED | OUTPATIENT
Start: 2018-07-11 | End: 2018-07-12 | Stop reason: HOSPADM

## 2018-07-11 RX ORDER — DOCUSATE SODIUM 100 MG/1
100 CAPSULE, LIQUID FILLED ORAL 2 TIMES DAILY PRN
Status: DISCONTINUED | OUTPATIENT
Start: 2018-07-11 | End: 2018-07-12 | Stop reason: HOSPADM

## 2018-07-11 RX ORDER — SODIUM CHLORIDE, SODIUM LACTATE, POTASSIUM CHLORIDE, CALCIUM CHLORIDE 600; 310; 30; 20 MG/100ML; MG/100ML; MG/100ML; MG/100ML
INJECTION, SOLUTION INTRAVENOUS PRN
Status: DISCONTINUED | OUTPATIENT
Start: 2018-07-11 | End: 2018-07-12 | Stop reason: HOSPADM

## 2018-07-11 RX ADMIN — Medication 999 ML/HR: at 07:05

## 2018-07-11 RX ADMIN — IBUPROFEN 600 MG: 600 TABLET, FILM COATED ORAL at 20:55

## 2018-07-11 RX ADMIN — SODIUM CHLORIDE, POTASSIUM CHLORIDE, SODIUM LACTATE AND CALCIUM CHLORIDE: 600; 310; 30; 20 INJECTION, SOLUTION INTRAVENOUS at 02:42

## 2018-07-11 RX ADMIN — Medication 125 ML/HR: at 08:30

## 2018-07-11 RX ADMIN — ROPIVACAINE HYDROCHLORIDE: 2 INJECTION, SOLUTION EPIDURAL; INFILTRATION at 02:38

## 2018-07-11 RX ADMIN — SODIUM CHLORIDE, POTASSIUM CHLORIDE, SODIUM LACTATE AND CALCIUM CHLORIDE: 600; 310; 30; 20 INJECTION, SOLUTION INTRAVENOUS at 01:56

## 2018-07-11 RX ADMIN — IBUPROFEN 600 MG: 600 TABLET, FILM COATED ORAL at 12:47

## 2018-07-11 ASSESSMENT — PAIN SCALES - GENERAL
PAINLEVEL_OUTOF10: 3
PAINLEVEL_OUTOF10: 0
PAINLEVEL_OUTOF10: 2
PAINLEVEL_OUTOF10: 2
PAINLEVEL_OUTOF10: 4
PAINLEVEL_OUTOF10: 2

## 2018-07-11 NOTE — PROGRESS NOTES
Pt arrive to S329, pt is a&o x4, pain controlled and no distress noted.  Oriented to rm and flr and updated with plan of care.  Call light in reach and encourage to call for assistance.

## 2018-07-11 NOTE — CARE PLAN
Problem: Altered physiologic condition related to immediate post-delivery state and potential for bleeding/hemorrhage  Goal: Patient physiologically stable as evidenced by normal lochia, palpable uterine involution and vital signs within normal limits  Outcome: PROGRESSING AS EXPECTED  Fundus firm, lochia light    Problem: Alteration in comfort related to episiotomy, vaginal repair and/or after birth pains  Goal: Patient is able to ambulate, care for self and infant  Outcome: PROGRESSING AS EXPECTED  Pain controlled with motrin

## 2018-07-11 NOTE — PROGRESS NOTES
"33yo  EDC 2018 39w0d presents here for IOL for elevated BP in the office. Pt denies problems during this pregnancy. Denies LOF, VB, and states that she had x2 UC's on her way to the hospital. Pt report +FM. Denies HA, blurred vision or R. Epigastric pain. Pt has trace swelling. Pt , Ck is at the bedside for support.    : SVE, 2/thick/ high exam by VINCENT Castaneda RN.    221: Received admission orders from Dr. Guthrie.    2330: Educated pt on Gel placement, verbalizes understanding. Pt up to bathroom to void.    2334: Gel Placed. Pt understands that she needs to lie flat for 1 hour.    0101: SVE, 2-3/60/-3 exam by VINCENT Castaneda RN.    0221: Dr. Cardona at bedside to place epidural.    0229: Epidural in place, tolerated well.    0507: SVE, /-1, BBOW exam by VINCENT Castaneda RN.    0530: Dr. Trujillo updated, ETA 0600/0630am.    0535: Pt called out c/o SROM. Pad checked, blood present on pad with clots. Pad changed.    0537: Pt c/o \"[place]ushing\" of fluid. Pad check, blood present on pad with clots.    0542: SVE, Complete/ 0, BBOW. Repositioned on left side.    0551: Dr. Trujillo notified of current SVE, bleeding and VD's. ETA 20mins.    0622: Dr. Trujillo at bedside.    0623: SVE, AROM, clear. 9cm exam by MD.    0656: SVE, Complete/ +2 exam by VINCENT Castaneda RN. Dr. Trujillo notified.    0657: Report given to RENO Wright RN.                     "

## 2018-07-11 NOTE — H&P
History and Physical      Chely Pizarro is a 32 y.o. female  at 39 weeks who presents for IOL>    Subjective:   positive  For CTXS.   positive and negative Feels pain   negative for LOF  negative for vaginal bleeding.   positive for fetal movement    ROS: Constitutional: negative  Respiratory: negative  Cardiovascular: negative  Gastrointestinal: negative  Genitourinary:negative    History reviewed. No pertinent past medical history.  Past Surgical History:   Procedure Laterality Date   • GYN SURGERY      labiaplasty     OB History    Para Term  AB Living   1             SAB TAB Ectopic Molar Multiple Live Births                    # Outcome Date GA Lbr Shane/2nd Weight Sex Delivery Anes PTL Lv   1 Current                 Social History     Social History   • Marital status:      Spouse name: N/A   • Number of children: N/A   • Years of education: N/A     Occupational History   • Not on file.     Social History Main Topics   • Smoking status: Never Smoker   • Smokeless tobacco: Never Used   • Alcohol use 2.4 oz/week     4 Glasses of wine per week   • Drug use: No   • Sexual activity: Yes     Partners: Male     Birth control/ protection: Pill     Other Topics Concern   • Not on file     Social History Narrative   • No narrative on file     Allergies: Pcn [penicillins]    Current Facility-Administered Medications:   •  ropivacaine (NAROPIN) injection, , Epidural, Continuous, Alfredo Cardona M.D., Last Rate: 10 mL/hr at 18 0238  •  LR infusion, , Intravenous, Continuous, Riky Guthrie M.D., Last Rate: 125 mL/hr at 18 0242  •  fentaNYL (SUBLIMAZE) injection 50 mcg, 50 mcg, Intravenous, Q HOUR PRN, Riky Guthrie M.D.  •  fentaNYL (SUBLIMAZE) injection 100 mcg, 100 mcg, Intravenous, Q HOUR PRN, Riky Guthrie M.D.  •  terbutaline (BRETHINE) injection 0.25 mg, 0.25 mg, Intravenous, PRN, Riky Guthrie M.D.  •  oxytocin (PITOCIN) infusion (for induction), 0.5-20  "amparo-units/min, Intravenous, Continuous, Riky Guthrie M.D.  •  miSOPROStol (CYTOTEC) tablet 800 mcg, 800 mcg, Rectal, Once PRN, Riky Guthrie M.D.  •  oxytocin (PITOCIN) 20 UNITS/1000ML LR, , , ,     Prenatal care with  starting at first trimester  with following problems:  Patient Active Problem List    Diagnosis Date Noted   • Routine health maintenance 11/14/2017   • Encounter for Papanicolaou smear of cervix 11/14/2017   • Pregnancy 11/14/2017   • Neck pain 10/18/2017   • Fatigue 10/18/2017               Objective:      Blood pressure 142/73, pulse 82, temperature 36.4 °C (97.6 °F), temperature source Temporal, height 1.702 m (5' 7\"), weight 81.6 kg (180 lb), last menstrual period 10/12/2017, SpO2 99 %, not currently breastfeeding.    General:   no acute distress, alert, cooperative   Skin:   normal   HEENT:  PERRLA   Lungs:   CTA bilateral   Heart:   S1, S2 normal, no murmur, click, rub or gallop, regular rate and rhythm, brisk carotid upstroke without bruits, peripheral pulses very brisk, chest is clear without rales or wheezing, no pedal edema, no JVD, no hepatosplenomegaly   Abdomen:   gravid, NT   EFW:  3600gms.   Pelvis:  adequate with gynecoid pelvis   FHT:  154 BPM   Uterine Size: S=D   Presentations: Cephalic   Cervix:     Dilation: 9    Effacement: 100%    Station:  -3    Consistency: Soft    Position: Anterior     Lab Review  Lab:   Blood type: O     Recent Results (from the past 5880 hour(s))   THINPREP PAP WITH HPV    Collection Time: 11/14/17 10:38 AM   Result Value Ref Range    Cytology Reg See Path Report     Source Cervical     HPV Genotype 16 Negative Negative    HPV Genotype 18 Negative Negative    HPV Other High Risk Genotypes Negative Negative   REFERENCE MISC.REFRIG 1    Collection Time: 01/15/18  1:31 PM   Result Value Ref Range    Misc. Lab Rslt SEE NOTE    OP PRENATAL PANEL-BLOOD BANK    Collection Time: 01/15/18  1:32 PM   Result Value Ref Range    ABO Grouping " Only O     Rh Grouping Only POS     Antibody Screen Scrn NEG    HIV AG/AB COMBO ASSAY SCREENING    Collection Time: 01/15/18  1:32 PM   Result Value Ref Range    HIV Ag/Ab Combo Assay Non Reactive Non Reactive   URINALYSIS,CULTURE IF INDICATED    Collection Time: 01/15/18  1:32 PM   Result Value Ref Range    Color Yellow     Character Clear     Specific Gravity 1.023 <1.035    Ph 5.5 5.0 - 8.0    Glucose Negative Negative mg/dL    Ketones Negative Negative mg/dL    Protein Negative Negative mg/dL    Bilirubin Negative Negative    Urobilinogen, Urine 0.2 Negative    Nitrite Negative Negative    Leukocyte Esterase Negative Negative    Occult Blood Negative Negative    Micro Urine Req see below     Culture Indicated No UA Culture   PRENATAL PANEL 3    Collection Time: 01/15/18  1:32 PM   Result Value Ref Range    WBC 7.1 4.8 - 10.8 K/uL    RBC 4.47 4.20 - 5.40 M/uL    Hemoglobin 13.9 12.0 - 16.0 g/dL    Hematocrit 39.6 37.0 - 47.0 %    MCV 88.6 81.4 - 97.8 fL    MCH 31.1 27.0 - 33.0 pg    MCHC 35.1 (H) 33.6 - 35.0 g/dL    RDW 37.4 35.9 - 50.0 fL    Platelet Count 226 164 - 446 K/uL    MPV 10.8 9.0 - 12.9 fL    Neutrophils-Polys 72.50 (H) 44.00 - 72.00 %    Lymphocytes 21.60 (L) 22.00 - 41.00 %    Monocytes 4.90 0.00 - 13.40 %    Eosinophils 0.40 0.00 - 6.90 %    Basophils 0.30 0.00 - 1.80 %    Immature Granulocytes 0.30 0.00 - 0.90 %    Nucleated RBC 0.00 /100 WBC    Neutrophils (Absolute) 5.17 2.00 - 7.15 K/uL    Lymphs (Absolute) 1.54 1.00 - 4.80 K/uL    Monos (Absolute) 0.35 0.00 - 0.85 K/uL    Eos (Absolute) 0.03 0.00 - 0.51 K/uL    Baso (Absolute) 0.02 0.00 - 0.12 K/uL    Immature Granulocytes (abs) 0.02 0.00 - 0.11 K/uL    NRBC (Absolute) 0.00 K/uL    Rubella IgG Antibody 41.00 IU/mL    Syphilis, Treponemal Qual Non Reactive Non Reactive    Hepatitis B Surface Antigen Negative Negative   AFP MATERNAL SERUM ALPHA-FETOPROTEIN    Collection Time: 02/10/18 10:57 AM   Result Value Ref Range    AFP Value -Eia 29 ng/mL     AFP MOM Value 0.73     Interpretation Normal     Insulin Dependent Diabetes No     [age]ENRY [age]    Gestational Age Based On US     Gestational Age 17.29 weeks    Maternal Weight 164 lbs    Race Black     Multiple Pregnancy One    COMP METABOLIC PANEL    Collection Time: 05/09/18 10:28 AM   Result Value Ref Range    Sodium 136 135 - 145 mmol/L    Potassium 3.8 3.6 - 5.5 mmol/L    Chloride 107 96 - 112 mmol/L    Co2 22 20 - 33 mmol/L    Anion Gap 7.0 0.0 - 11.9    Glucose 73 65 - 99 mg/dL    Bun 6 (L) 8 - 22 mg/dL    Creatinine 0.53 0.50 - 1.40 mg/dL    Calcium 8.7 8.5 - 10.5 mg/dL    AST(SGOT) 13 12 - 45 U/L    ALT(SGPT) 9 2 - 50 U/L    Alkaline Phosphatase 56 30 - 99 U/L    Total Bilirubin 0.3 0.1 - 1.5 mg/dL    Albumin 3.4 3.2 - 4.9 g/dL    Total Protein 6.3 6.0 - 8.2 g/dL    Globulin 2.9 1.9 - 3.5 g/dL    A-G Ratio 1.2 g/dL   LIPID PROFILE    Collection Time: 05/09/18 10:28 AM   Result Value Ref Range    Cholesterol,Tot 232 (H) 100 - 199 mg/dL    Triglycerides 243 (H) 0 - 149 mg/dL    HDL 60 >=40 mg/dL     (H) <100 mg/dL   CBC WITH DIFFERENTIAL    Collection Time: 05/09/18 10:28 AM   Result Value Ref Range    WBC 8.2 4.8 - 10.8 K/uL    RBC 4.18 (L) 4.20 - 5.40 M/uL    Hemoglobin 12.7 12.0 - 16.0 g/dL    Hematocrit 37.6 37.0 - 47.0 %    MCV 90.0 81.4 - 97.8 fL    MCH 30.4 27.0 - 33.0 pg    MCHC 33.8 33.6 - 35.0 g/dL    RDW 41.8 35.9 - 50.0 fL    Platelet Count 174 164 - 446 K/uL    MPV 10.2 9.0 - 12.9 fL    Neutrophils-Polys 72.70 (H) 44.00 - 72.00 %    Lymphocytes 20.60 (L) 22.00 - 41.00 %    Monocytes 5.40 0.00 - 13.40 %    Eosinophils 0.20 0.00 - 6.90 %    Basophils 0.40 0.00 - 1.80 %    Immature Granulocytes 0.70 0.00 - 0.90 %    Nucleated RBC 0.00 /100 WBC    Neutrophils (Absolute) 5.96 2.00 - 7.15 K/uL    Lymphs (Absolute) 1.69 1.00 - 4.80 K/uL    Monos (Absolute) 0.44 0.00 - 0.85 K/uL    Eos (Absolute) 0.02 0.00 - 0.51 K/uL    Baso (Absolute) 0.03 0.00 - 0.12 K/uL    Immature  Granulocytes (abs) 0.06 0.00 - 0.11 K/uL    NRBC (Absolute) 0.00 K/uL   TSH WITH REFLEX TO FT4    Collection Time: 05/09/18 10:28 AM   Result Value Ref Range    TSH 1.030 0.380 - 5.330 uIU/mL   ESTIMATED GFR    Collection Time: 05/09/18 10:28 AM   Result Value Ref Range    GFR If African American >60 >60 mL/min/1.73 m 2    GFR If Non African American >60 >60 mL/min/1.73 m 2   GLUCOSE 1HR GESTATIONAL    Collection Time: 05/09/18 11:35 AM   Result Value Ref Range    Glucose, Post Dose 120 70 - 139 mg/dL   POC UA    Collection Time: 07/10/18  9:45 PM   Result Value Ref Range    POC Color Yellow     POC Appearance Cloudy (A)     POC Glucose 500 (A) Negative mg/dL    POC Ketones 15 (A) Negative mg/dL    POC Specific Gravity >=1.030 (A) 1.005 - 1.030    POC Blood Trace-intact (A) Negative    POC Urine PH 5.5 5.0 - 8.0    POC Protein 30 (A) Negative mg/dL    POC Nitrites Negative Negative    POC Leukocyte Esterase Small (A) Negative   Hold Blood Bank Specimen (Not Tested)    Collection Time: 07/10/18 11:00 PM   Result Value Ref Range    Holding Tube - Bb DONE    CBC WITH DIFFERENTIAL    Collection Time: 07/10/18 11:00 PM   Result Value Ref Range    WBC 9.0 4.8 - 10.8 K/uL    RBC 4.12 (L) 4.20 - 5.40 M/uL    Hemoglobin 12.5 12.0 - 16.0 g/dL    Hematocrit 35.7 (L) 37.0 - 47.0 %    MCV 86.7 81.4 - 97.8 fL    MCH 30.3 27.0 - 33.0 pg    MCHC 35.0 33.6 - 35.0 g/dL    RDW 39.6 35.9 - 50.0 fL    Platelet Count 188 164 - 446 K/uL    MPV 10.9 9.0 - 12.9 fL    Neutrophils-Polys 72.30 (H) 44.00 - 72.00 %    Lymphocytes 20.50 (L) 22.00 - 41.00 %    Monocytes 6.30 0.00 - 13.40 %    Eosinophils 0.20 0.00 - 6.90 %    Basophils 0.10 0.00 - 1.80 %    Immature Granulocytes 0.60 0.00 - 0.90 %    Nucleated RBC 0.00 /100 WBC    Neutrophils (Absolute) 6.48 2.00 - 7.15 K/uL    Lymphs (Absolute) 1.84 1.00 - 4.80 K/uL    Monos (Absolute) 0.56 0.00 - 0.85 K/uL    Eos (Absolute) 0.02 0.00 - 0.51 K/uL    Baso (Absolute) 0.01 0.00 - 0.12 K/uL     Immature Granulocytes (abs) 0.05 0.00 - 0.11 K/uL    NRBC (Absolute) 0.00 K/uL        Assessment:   Chely Roya Juarez at Unknown  Labor status: Active phase labor.  Obstetrical history significant for   Patient Active Problem List    Diagnosis Date Noted   • Routine health maintenance 11/14/2017   • Encounter for Papanicolaou smear of cervix 11/14/2017   • Pregnancy 11/14/2017   • Neck pain 10/18/2017   • Fatigue 10/18/2017   .      Plan:     Admit to L&D  For IOL had Prostin gel went into active labor.  GBS negative

## 2018-07-11 NOTE — PROGRESS NOTES
0703: Viable female per Dr. Guthrie, baby with 8/9 APGARS, pt skin to skin with baby.  1040: Pt and baby transferred to postpartum via wheelchair, report given to Arabella ALVA, pt in stable condition with a firm fundus and light lochia.

## 2018-07-12 VITALS
HEART RATE: 81 BPM | WEIGHT: 180 LBS | BODY MASS INDEX: 28.25 KG/M2 | OXYGEN SATURATION: 98 % | RESPIRATION RATE: 18 BRPM | DIASTOLIC BLOOD PRESSURE: 96 MMHG | SYSTOLIC BLOOD PRESSURE: 129 MMHG | TEMPERATURE: 97.6 F | HEIGHT: 67 IN

## 2018-07-12 PROCEDURE — 700102 HCHG RX REV CODE 250 W/ 637 OVERRIDE(OP): Performed by: OBSTETRICS & GYNECOLOGY

## 2018-07-12 PROCEDURE — A9270 NON-COVERED ITEM OR SERVICE: HCPCS | Performed by: OBSTETRICS & GYNECOLOGY

## 2018-07-12 RX ADMIN — IBUPROFEN 600 MG: 600 TABLET, FILM COATED ORAL at 08:50

## 2018-07-12 ASSESSMENT — PAIN SCALES - GENERAL
PAINLEVEL_OUTOF10: 0
PAINLEVEL_OUTOF10: 0
PAINLEVEL_OUTOF10: 4
PAINLEVEL_OUTOF10: 0

## 2018-07-12 NOTE — DISCHARGE SUMMARY
Discharge Summary:      Chely Pizarro      Admit Date:   7/10/2018  Discharge Date:  2018     Admitting diagnosis:  Pregnancy  IOL  Labor and delivery, indication for care  Discharge Diagnosis: Status post vaginal, spontaneous.  Pregnancy Complications: none  Tubal Ligation:  no        History:  History reviewed. No pertinent past medical history.  OB History    Para Term  AB Living   1             SAB TAB Ectopic Molar Multiple Live Births                    # Outcome Date GA Lbr Shane/2nd Weight Sex Delivery Anes PTL Lv   1 Current                    Pcn [penicillins]  Patient Active Problem List    Diagnosis Date Noted   • Routine health maintenance 2017   • Encounter for Papanicolaou smear of cervix 2017   • Pregnancy 2017   • Neck pain 10/18/2017   • Fatigue 10/18/2017        Hospital Course:   32 y.o. , now para 3, was admitted with the above mentioned diagnosis, underwent Induction of Labor, vaginal, spontaneous. Patient postpartum course was unremarkable, with progressive advancement in diet , ambulation and toleration of oral analgesia. Patient without complaints today and desires discharge.      Vitals:    18 1200 18 1600 18 2200 18 0800   BP: 117/77 113/80 120/85 129/96   Pulse: 75 80 79 81   Resp:    Temp: 36.7 °C (98 °F) 37 °C (98.6 °F) 37.2 °C (98.9 °F) 36.4 °C (97.6 °F)   TempSrc:       SpO2: 97% 96% 95% 98%   Weight:       Height:           Current Facility-Administered Medications   Medication Dose   • ondansetron (ZOFRAN ODT) dispertab 4 mg  4 mg    Or   • ondansetron (ZOFRAN) syringe/vial injection 4 mg  4 mg   • oxytocin (PITOCIN) infusion (for postpartum)   mL/hr   • ibuprofen (MOTRIN) tablet 600 mg  600 mg   • LR infusion     • miSOPROStol (CYTOTEC) tablet 600 mcg  600 mcg   • PRN oxytocin (PITOCIN) (20 Units/1000 mL) PRN for excessive uterine bleeding - See Admin Instr  125-999 mL/hr   • docusate sodium  (COLACE) capsule 100 mg  100 mg       Exam:  Breast Exam: negative  Abdomen: Abdomen soft, non-tender. BS normal. No masses,  No organomegaly  Fundus Non Tender: yes  Perineum: perineum intact  Extremity: extremities, peripheral pulses and reflexes normal     Labs:  Recent Labs      07/10/18   2300  07/11/18   1530   WBC  9.0  11.1*   RBC  4.12*  3.94*   HEMOGLOBIN  12.5  12.0   HEMATOCRIT  35.7*  34.8*   MCV  86.7  88.3   MCH  30.3  30.5   MCHC  35.0  34.5   RDW  39.6  41.2   PLATELETCT  188  157*   MPV  10.9  10.8        Activity:   Discharge to home  Pelvic Rest x 6 weeks    Assessment:  normal postpartum course  Discharge Assessment: No heavy bleeding or foul vaginal discharge      [facility]     Discharge Meds:   No current outpatient prescriptions on file.       Riky Guthrie M.D.

## 2018-07-12 NOTE — DISCHARGE INSTRUCTIONS
POSTPARTUM DISCHARGE INSTRUCTIONS FOR MOM    YOB: 1985   Age: 32 y.o.               Admit Date: 7/10/2018     Discharge Date: 2018  Attending Doctor:  Riky Guthrie M.D.                  Allergies:  Pcn [penicillins]    Discharged to home by car. Discharged via wheelchair, hospital escort: Yes.  Special equipment needed: Not Applicable  Belongings with: Personal  Be sure to schedule a follow-up appointment with your primary care doctor or any specialists as instructed.     Discharge Plan:   Diet Plan: Discussed  Activity Level: Discussed  Confirmed Follow up Appointment: Patient to Call and Schedule Appointment  Confirmed Symptoms Management: Discussed  Medication Reconciliation Updated: Yes    REASONS TO CALL YOUR OBSTETRICIAN:  1.   Persistent fever or shaking chills (Temperature higher than 100.4)  2.   Heavy bleeding (soaking more than 1 pad per hour); Passing clots  3.   Foul odor from vagina  4.   Mastitis (Breast infection; breast pain, chills, fever, redness)  5.   Urinary pain, burning or frequency  6.   Episiotomy infection  7.   Abdominal incision infection  8.   Severe depression longer than 24 hours    HAND WASHING  · Prior to handling the baby.  · Before breastfeeding or bottle feeding baby.  · After using the bathroom or changing the baby's diaper.    WOUND CARE  Ask your physician for additional care instructions.  In general:    ·  Incision:      · Keep clean and dry.    · Do NOT lift anything heavier than your baby for up to 6 weeks.    · There should not be any opening or pus.      VAGINAL CARE  · Nothing inside vagina for 6 weeks: no sexual intercourse, tampons or douching.  · Bleeding may continue for 2-4 weeks.  Amount may vary.    · Call your physician for heavy bleeding which means soaking more than 1 pad per hour    BIRTH CONTROL  · It is possible to become pregnant at any time after delivery and while breastfeeding.  · Plan to discuss a method of birth control  "with your physician at your follow up visit. visit.    DIET AND ELIMINATION  · Eating more fiber (bran cereal, fruits, and vegetables) and drinking plenty of fluids will help to avoid constipation.  · Urinary frequency after childbirth is normal.    POSTPARTUM BLUES  During the first few days after birth, you may experience a sense of the \"blues\" which may include impatience, irritability or even crying.  These feeling come and go quickly.  However, as many as 1 in 10 women experience emotional symptoms known as postpartum depression.    Postpartum depression:  May start as early as the second or third day after delivery or take several weeks or months to develop.  Symptoms of \"blues\" are present, but are more intense:  Crying spells; loss of appetite; feelings of hopelessness or loss of control; fear of touching the baby; over concern or no concern at all about the baby; little or no concern about your own appearance/caring for yourself; and/or inability to sleep or excessive sleeping.  Contact your physician if you are experiencing any of these symptoms.    Crisis Hotline:  · [place] Crisis Hotline:  1-085-GSZYXNX  Or 1-185.683.6194  · [place] Crisis Hotline:  1-738.760.1252  Or 004-523-4819    PREVENTING SHAKEN BABY:  If you are angry or stressed, PUT THE BABY IN THE CRIB, step away, take some deep breaths, and wait until you are calm to care for the baby.  DO NOT SHAKE THE BABY.  You are not alone, call a supporter for help.    · Crisis Call Center 24/7 crisis line 267-431-1294 or 1-149.923.8651  · You can also text them, text \"ANSWER\" to 714869    QUIT SMOKING/TOBACCO USE:  I understand the use of any tobacco products increases my chance of suffering from future heart disease and could cause other illnesses which may shorten my life. Quitting the use of tobacco products is the single most important thing I can do to improve my health. For further information on smoking / tobacco cessation call a Toll Free Quit " Line at 1-512.282.9051 (*[employer]) or 1-491.566.5794 (American Lung Association) or you can access the web based program at www.lungusa.org.    · [place] Tobacco Users Help Line:  (855) 929-3403       Toll Free: 1-610.583.4567  · Quit Tobacco Program [facility] (386)015-9658    DEPRESSION / SUICIDE RISK:  As you are discharged from this [facility], it is important to learn how to keep safe from harming yourself.    Recognize the warning signs:  · Abrupt changes in personality, positive or negative- including increase in energy   · Giving away possessions  · Change in eating patterns- significant weight changes-  positive or negative  · Change in sleeping patterns- unable to sleep or sleeping all the time   · Unwillingness or inability to communicate  · Depression  · Unusual sadness, discouragement and loneliness  · Talk of wanting to die  · Neglect of personal appearance   · Rebelliousness- reckless behavior  · Withdrawal from people/activities they love  · Confusion- inability to concentrate     If you or a loved one observes any of these behaviors or has concerns about self-harm, here's what you can do:  · Talk about it- your feelings and reasons for harming yourself  · Remove any means that you might use to hurt yourself (examples: pills, rope, extension cords, firearm)  · Get professional help from the community (Mental Health, Substance Abuse, psychological counseling)  · Do not be alone:Call your Safe Contact- someone whom you trust who will be there for you.  · Call your local CRISIS HOTLINE 229-6331 or 466-888-0323  · Call your local Children's Mobile Crisis Response Team [place] (139) 198-2085 or www.DTVCast  · Call the toll free National Suicide Prevention Hotlines   · National Suicide Prevention Lifeline 612-176-EQQX (3743)  · National Hope Line Network 800-SUICIDE (964-9532)    DISCHARGE SURVEY:  Thank you for choosing [facility].  We  hope we provided you with very good care.  You may be receiving a survey in the mail.  Please fill it out.  Your opinion is valuable to us.    ADDITIONAL EDUCATIONAL MATERIALS GIVEN TO PATIENT:        My signature on this form indicates that:  1.  I have reviewed and understand the above information  2.  My questions regarding this information have been answered to my satisfaction.  3.  I have formulated a plan with my discharge nurse to obtain my prescribed medication for home.

## 2018-07-12 NOTE — PROGRESS NOTES
1105- Discharge education discussed with patient.  Patient verbalized understanding. Patient verbalized understanding of follow up appointments for herself and infant.

## 2018-07-12 NOTE — PROGRESS NOTES
Assumed care of pt at 0700. Pt denies any pain but would like to keep up on motrin this morning, motrin administered. Pt ready to go home and requesting discharge as soon as possible. RN discussed discharge procedure with pt and assured pt discharge would be completed as soon as possible. Fundus firm and lochia light. Hourly rounding in place.

## 2018-07-12 NOTE — CONSULTS
MOB has hx of breastfeeding other two children for 6 months and 10 months. Reports this baby breastfeeding since delivery successfully with good latches and cluster feeding and with minimum feeds of 8x/24 hours. Observed latch- see flowsheet. The Best Beginnings booklet given with review of resources available outpatient through [facility] with 1:1 Consultations with appointment and the Forums as needed.      Breastfeeding POC:      Breastfeeding on cue a minimum of 8x/24 hours. Access resources as needed or desired through [facility].

## 2018-07-17 NOTE — DOCUMENTATION QUERY
DOCUMENTATION QUERY    PROVIDERS: Please select “Cosign w/ note”to reply to query.    To better represent the severity of illness of your patient, please review the following information and exercise your independent professional judgment in responding to this query.     It is documented in the Delivery note that this patient had borderline elevated blood pressure. Can this be further specified?    -Elevated blood pressure reading without diagnosis of hypertension  -Gestational hypertension  -Pregnancy-induced Hypertension  -Other (please specify)  -Unable to determine       The medical record reflects the following:   Clinical Findings  elevated blood pressure   Treatment    Risk Factors    Location within medical record  Delivery note     Thank you,   Xi Starks

## 2018-09-03 ENCOUNTER — OFFICE VISIT (OUTPATIENT)
Dept: URGENT CARE | Facility: PHYSICIAN GROUP | Age: 33
End: 2018-09-03
Payer: COMMERCIAL

## 2018-09-03 VITALS
DIASTOLIC BLOOD PRESSURE: 70 MMHG | HEART RATE: 82 BPM | BODY MASS INDEX: 25.06 KG/M2 | WEIGHT: 160 LBS | SYSTOLIC BLOOD PRESSURE: 122 MMHG | OXYGEN SATURATION: 97 % | TEMPERATURE: 98.7 F

## 2018-09-03 DIAGNOSIS — J02.9 PHARYNGITIS, UNSPECIFIED ETIOLOGY: ICD-10-CM

## 2018-09-03 DIAGNOSIS — Z20.818 STREPTOCOCCUS EXPOSURE: ICD-10-CM

## 2018-09-03 LAB
INT CON NEG: NEGATIVE
INT CON POS: POSITIVE
S PYO AG THROAT QL: NORMAL

## 2018-09-03 PROCEDURE — 99213 OFFICE O/P EST LOW 20 MIN: CPT | Performed by: FAMILY MEDICINE

## 2018-09-03 PROCEDURE — 87880 STREP A ASSAY W/OPTIC: CPT | Performed by: FAMILY MEDICINE

## 2018-09-03 RX ORDER — AZITHROMYCIN 250 MG/1
TABLET, FILM COATED ORAL
Qty: 6 QUANTITY SUFFICIENT | Refills: 0 | Status: SHIPPED
Start: 2018-09-03 | End: 2020-02-15

## 2018-09-03 ASSESSMENT — ENCOUNTER SYMPTOMS
MYALGIAS: 0
HEADACHES: 0
WHEEZING: 0
WEIGHT LOSS: 0
EYE REDNESS: 0
EYE DISCHARGE: 0
BACK PAIN: 0
SHORTNESS OF BREATH: 0

## 2018-09-03 ASSESSMENT — PAIN SCALES - GENERAL: PAINLEVEL: 4=SLIGHT-MODERATE PAIN

## 2018-09-03 NOTE — PROGRESS NOTES
Subjective:      Chely Pizarro is a 32 y.o. female who presents with Pharyngitis (x2 days. Sore throat, slight cough.)            2 days sore throat, nonproductive cough.  No fever.  No rash.  Concerned about strep throat. and had a close contact exposure  Symptoms are moderate severity and minimally responsive to over-the-counter treatment.  Tolerating fluids with normal urine output.  No other aggravating or alleviating factors.        Review of Systems   Constitutional: Negative for malaise/fatigue and weight loss.   Eyes: Negative for discharge and redness.   Respiratory: Negative for shortness of breath and wheezing.    Musculoskeletal: Negative for back pain and myalgias.   Neurological: Negative for headaches.     .  Medications, Allergies, and current problem list reviewed today in Epic       Objective:     /70   Pulse 82   Temp 37.1 °C (98.7 °F)   Wt 72.6 kg (160 lb)   LMP 10/12/2017   SpO2 97%   Breastfeeding? Yes   BMI 25.06 kg/m²      Physical Exam   Constitutional: She is oriented to person, place, and time. She appears well-developed and well-nourished. No distress.   HENT:   Head: Normocephalic and atraumatic.   Right Ear: External ear normal.   Left Ear: External ear normal.   Pharynx red without exudate   Eyes: Conjunctivae are normal.   Neck: Neck supple.   Cardiovascular: Normal rate, regular rhythm and normal heart sounds.    Pulmonary/Chest: Effort normal and breath sounds normal.   Lymphadenopathy:     She has cervical adenopathy.   Neurological: She is alert and oriented to person, place, and time.   Skin: Skin is warm and dry. No rash noted.               Assessment/Plan:   Rapid strep negative    1. Pharyngitis, unspecified etiology  POCT Rapid Strep A    azithromycin (ZITHROMAX) 250 MG Tab    lidocaine viscous 2% (XYLOCAINE) 2 % Solution   2. Streptococcus exposure       Differential diagnosis, natural history, supportive care, and indications for immediate follow-up  discussed at length.     Contingent antibiotic prescription given to patient to fill upon meeting criteria of guidelines discussed.

## 2019-07-24 ENCOUNTER — HOSPITAL ENCOUNTER (OUTPATIENT)
Facility: MEDICAL CENTER | Age: 34
End: 2019-07-24
Attending: PHYSICIAN ASSISTANT
Payer: COMMERCIAL

## 2019-07-24 ENCOUNTER — OFFICE VISIT (OUTPATIENT)
Dept: URGENT CARE | Facility: PHYSICIAN GROUP | Age: 34
End: 2019-07-24
Payer: COMMERCIAL

## 2019-07-24 VITALS
SYSTOLIC BLOOD PRESSURE: 124 MMHG | WEIGHT: 156 LBS | TEMPERATURE: 97.4 F | HEIGHT: 67 IN | BODY MASS INDEX: 24.48 KG/M2 | DIASTOLIC BLOOD PRESSURE: 90 MMHG | RESPIRATION RATE: 12 BRPM | OXYGEN SATURATION: 96 % | HEART RATE: 93 BPM

## 2019-07-24 DIAGNOSIS — Z20.2 POSSIBLE EXPOSURE TO STD: ICD-10-CM

## 2019-07-24 LAB
APPEARANCE UR: NORMAL
BILIRUB UR STRIP-MCNC: NEGATIVE MG/DL
COLOR UR AUTO: NORMAL
GLUCOSE UR STRIP.AUTO-MCNC: NEGATIVE MG/DL
INT CON NEG: NEGATIVE
INT CON POS: POSITIVE
KETONES UR STRIP.AUTO-MCNC: NEGATIVE MG/DL
LEUKOCYTE ESTERASE UR QL STRIP.AUTO: NEGATIVE
NITRITE UR QL STRIP.AUTO: NEGATIVE
PH UR STRIP.AUTO: 5.5 [PH] (ref 5–8)
POC URINE PREGNANCY TEST: NEGATIVE
PROT UR QL STRIP: NEGATIVE MG/DL
RBC UR QL AUTO: NEGATIVE
SP GR UR STRIP.AUTO: >=1.03
UROBILINOGEN UR STRIP-MCNC: 0.2 MG/DL

## 2019-07-24 PROCEDURE — 81002 URINALYSIS NONAUTO W/O SCOPE: CPT | Performed by: PHYSICIAN ASSISTANT

## 2019-07-24 PROCEDURE — 87480 CANDIDA DNA DIR PROBE: CPT

## 2019-07-24 PROCEDURE — 81025 URINE PREGNANCY TEST: CPT | Performed by: PHYSICIAN ASSISTANT

## 2019-07-24 PROCEDURE — 99212 OFFICE O/P EST SF 10 MIN: CPT | Performed by: PHYSICIAN ASSISTANT

## 2019-07-24 PROCEDURE — 87660 TRICHOMONAS VAGIN DIR PROBE: CPT

## 2019-07-24 PROCEDURE — 87591 N.GONORRHOEAE DNA AMP PROB: CPT

## 2019-07-24 PROCEDURE — 87491 CHLMYD TRACH DNA AMP PROBE: CPT

## 2019-07-24 PROCEDURE — 87510 GARDNER VAG DNA DIR PROBE: CPT

## 2019-07-24 ASSESSMENT — ENCOUNTER SYMPTOMS
NAUSEA: 0
FLANK PAIN: 0
COUGH: 0
ABDOMINAL PAIN: 0
MYALGIAS: 0
CHILLS: 0
VOMITING: 0
DIARRHEA: 0
FEVER: 0

## 2019-07-24 NOTE — PROGRESS NOTES
Subjective:      Chely Pizarro is a 33 y.o. female who presents with Other (possible std)            HPI  Patient is here for evaluation.  States that she and her  were on a break and during that time 1 month ago she received oral sex from a male partner.  Denies any  penetration of any sort.  Denies vaginal complaints such as discharge, foul odor, itch or urinary symptoms.  Denies fever, chills, nauseous, vomiting, abdominal pain diarrhea.  Denies any known history of STDs    Past medical history: Is not pertinent to this examination  Past surgical history: Not pertinent to this examination  Family history: Is not pertinent to this examination  Allergies:   No current facility-administered medications for this visit.        Last reviewed on 7/24/2019  1:50 PM by Duncan Troncoso Ass't      Social history: Is reviewed in [facility]  Current Outpatient Prescriptions on File Prior to Visit   Medication Sig Dispense Refill   • etonogestrel (NEXPLANON) 68 MG Implant implant Inject 1 Each as instructed Once.     • PANTOPRAZOLE SODIUM PO Take  by mouth.     • azithromycin (ZITHROMAX) 250 MG Tab 2 PO day #1 then 1 PO day #2-5 (Patient not taking: Reported on 7/24/2019) 6 Quantity Sufficient 0   • lidocaine viscous 2% (XYLOCAINE) 2 % Solution Take 15 mL by mouth as needed for Throat/Mouth Pain. (Patient not taking: Reported on 7/24/2019) 120 mL 0   • prenatal plus vitamin (STUARTNATAL 1+1) 27-1 MG Tab tablet Take 1 Tab by mouth every morning. (Patient not taking: Reported on 7/24/2019) 90 Tab 3   • LOW-OGESTREL 0.3-30 MG-MCG Tab        No current facility-administered medications on file prior to visit.        Review of Systems   Constitutional: Negative for chills and fever.   Respiratory: Negative for cough.    Cardiovascular: Negative for chest pain.   Gastrointestinal: Negative for abdominal pain, diarrhea, nausea and vomiting.   Genitourinary: Negative for dysuria, flank pain, frequency, hematuria and  "urgency.   Musculoskeletal: Negative for myalgias.   Skin: Negative for itching and rash.          Objective:     /90 (BP Location: Right arm, Patient Position: Sitting, BP Cuff Size: Adult)   Pulse 93   Temp 36.3 °C (97.4 °F) (Temporal)   Resp 12   Ht 1.702 m (5' 7\")   Wt 70.8 kg (156 lb)   SpO2 96%   BMI 24.43 kg/m²      Physical Exam   Constitutional: She appears well-developed and well-nourished.   HENT:   Head: Normocephalic and atraumatic.   Eyes: Pupils are equal, round, and reactive to light. EOM are normal.   Neck: Normal range of motion.   Cardiovascular: Normal rate and regular rhythm.    Pulmonary/Chest: Effort normal.   Abdominal: Soft.   Genitourinary: Vagina normal and uterus normal. Rectal exam shows no mass. There is no rash on the right labia. There is no rash on the left labia. Cervix exhibits no motion tenderness and no discharge. Right adnexum displays no mass, no tenderness and no fullness. Left adnexum displays no mass, no tenderness and no fullness.   Musculoskeletal: Normal range of motion.   Skin: Capillary refill takes less than 2 seconds.   Psychiatric: She has a normal mood and affect.          Urgent care course: Urinalysis unremarkable.  Urine pregnancy negative     Assessment/Plan:     1. Possible exposure to STD  Will call with results.  Patient's exam was benign.  She also does not have symptoms suggestive of STD.  At this point will await culture results  - VAGINAL PATHOGENS DNA PANEL; Standing  - CHLAMYDIA/GC PCR URINE OR SWAB; Future  - VAGINAL PATHOGENS DNA PANEL  - POCT Urinalysis  - POC URINE PREGNANCY      "

## 2019-07-25 LAB
C TRACH DNA SPEC QL NAA+PROBE: NEGATIVE
CANDIDA DNA VAG QL PROBE+SIG AMP: NEGATIVE
G VAGINALIS DNA VAG QL PROBE+SIG AMP: NEGATIVE
N GONORRHOEA DNA SPEC QL NAA+PROBE: NEGATIVE
SPECIMEN SOURCE: NORMAL
T VAGINALIS DNA VAG QL PROBE+SIG AMP: NEGATIVE

## 2019-07-26 ENCOUNTER — TELEPHONE (OUTPATIENT)
Dept: URGENT CARE | Facility: PHYSICIAN GROUP | Age: 34
End: 2019-07-26

## 2019-07-27 NOTE — TELEPHONE ENCOUNTER
Left message letting patient know that her lab results came back negative (per her request).  Did not discuss specifics but told her to call [place] urgent care if she had any questions.

## 2019-12-18 ENCOUNTER — OFFICE VISIT (OUTPATIENT)
Dept: URGENT CARE | Facility: PHYSICIAN GROUP | Age: 34
End: 2019-12-18
Payer: COMMERCIAL

## 2019-12-18 VITALS
DIASTOLIC BLOOD PRESSURE: 74 MMHG | SYSTOLIC BLOOD PRESSURE: 118 MMHG | RESPIRATION RATE: 18 BRPM | HEART RATE: 82 BPM | WEIGHT: 168 LBS | OXYGEN SATURATION: 99 % | BODY MASS INDEX: 26.37 KG/M2 | HEIGHT: 67 IN | TEMPERATURE: 98.2 F

## 2019-12-18 DIAGNOSIS — R05.9 COUGH: ICD-10-CM

## 2019-12-18 DIAGNOSIS — J22 LOWER RESPIRATORY INFECTION: ICD-10-CM

## 2019-12-18 PROCEDURE — 99214 OFFICE O/P EST MOD 30 MIN: CPT | Performed by: NURSE PRACTITIONER

## 2019-12-18 RX ORDER — DOXYCYCLINE 100 MG/1
100 CAPSULE ORAL 2 TIMES DAILY
Qty: 10 CAP | Refills: 0 | Status: SHIPPED | OUTPATIENT
Start: 2019-12-18 | End: 2019-12-23

## 2019-12-18 RX ORDER — ALBUTEROL SULFATE 90 UG/1
2 AEROSOL, METERED RESPIRATORY (INHALATION) EVERY 6 HOURS PRN
Qty: 8.5 G | Refills: 0 | Status: SHIPPED | OUTPATIENT
Start: 2019-12-18 | End: 2021-10-31

## 2019-12-18 ASSESSMENT — ENCOUNTER SYMPTOMS
FEVER: 0
SORE THROAT: 0
SHORTNESS OF BREATH: 0
DIZZINESS: 0
COUGH: 1
CHILLS: 0
HEADACHES: 0
SINUS PAIN: 0

## 2019-12-18 NOTE — PROGRESS NOTES
Subjective:      Chely Pizarro is a 34 y.o. female who presents with Cough (chest congestion, slightly struggling to breath normally x1 1/2 weeks)            Cough   This is a new problem. Episode onset: 10 days. The problem has been gradually worsening. The problem occurs every few minutes. The cough is productive of purulent sputum. Pertinent negatives include no chills, fever, headaches, nasal congestion, sore throat or shortness of breath. Associated symptoms comments: Patient notes feeling like she was getting better but now has a worsening cough.. The symptoms are aggravated by lying down. Treatments tried: dayquil. nyquil. The treatment provided mild relief.       Review of Systems   Constitutional: Negative for chills and fever.   HENT: Negative for sinus pain and sore throat.    Respiratory: Positive for cough. Negative for shortness of breath.    Neurological: Negative for dizziness and headaches.       History reviewed. No pertinent past medical history.   Past Surgical History:   Procedure Laterality Date   • GYN SURGERY      labiaplasty      Social History     Socioeconomic History   • Marital status:      Spouse name: Not on file   • Number of children: Not on file   • Years of education: Not on file   • Highest education level: Not on file   Occupational History   • Not on file   Social Needs   • Financial resource strain: Not on file   • Food insecurity:     Worry: Not on file     Inability: Not on file   • Transportation needs:     Medical: Not on file     Non-medical: Not on file   Tobacco Use   • Smoking status: Never Smoker   • Smokeless tobacco: Never Used   Substance and Sexual Activity   • Alcohol use: Yes     Alcohol/week: 2.4 oz     Types: 4 Glasses of wine per week   • Drug use: No   • Sexual activity: Yes     Partners: Male     Birth control/protection: Pill   Lifestyle   • Physical activity:     Days per week: Not on file     Minutes per session: Not on file   • Stress: Not  "on file   Relationships   • Social connections:     Talks on phone: Not on file     Gets together: Not on file     Attends Jehovah's witness service: Not on file     Active member of club or organization: Not on file     Attends meetings of clubs or organizations: Not on file     Relationship status: Not on file   • Intimate partner violence:     Fear of current or ex partner: Not on file     Emotionally abused: Not on file     Physically abused: Not on file     Forced sexual activity: Not on file   Other Topics Concern   • Not on file   Social History Narrative   • Not on file   Allergies:  Pcn [penicillins]       Objective:     /74 (BP Location: Left arm, Patient Position: Sitting, BP Cuff Size: Adult)   Pulse 82   Temp 36.8 °C (98.2 °F) (Temporal)   Resp 18   Ht 1.702 m (5' 7\")   Wt 76.2 kg (168 lb)   LMP 12/09/2019 (Exact Date)   SpO2 99%   BMI 26.31 kg/m²      Physical Exam  Vitals signs reviewed.   Constitutional:       Appearance: Normal appearance.   HENT:      Right Ear: Tympanic membrane, ear canal and external ear normal.      Left Ear: Tympanic membrane, ear canal and external ear normal.      Mouth/Throat:      Lips: Pink.      Mouth: Mucous membranes are moist.      Pharynx: Uvula midline.      Comments: Postnasal drip noted  Cardiovascular:      Rate and Rhythm: Normal rate and regular rhythm.      Heart sounds: Normal heart sounds.   Pulmonary:      Effort: Pulmonary effort is normal.      Breath sounds: Normal breath sounds.      Comments: Productive cough noted  Lymphadenopathy:      Cervical: Cervical adenopathy present.   Neurological:      Mental Status: She is alert and oriented to person, place, and time.   Psychiatric:         Mood and Affect: Mood normal.         Behavior: Behavior normal.         Thought Content: Thought content normal.         Judgment: Judgment normal.                 Assessment/Plan:     1. Lower respiratory infection  - doxycycline (MONODOX) 100 MG capsule; Take 1 " Cap by mouth 2 times a day for 5 days.  Dispense: 10 Cap; Refill: 0    2. Cough  - albuterol 108 (90 Base) MCG/ACT Aero Soln inhalation aerosol; Inhale 2 Puffs by mouth every 6 hours as needed for Shortness of Breath.  Dispense: 8.5 g; Refill: 0    Discussed physical examination findings will be treated with a antibiotic due to purulent cough and length of symptoms.  Instructed patient to increase fluids, take over-the-counter NSAIDs and Tylenol per 's instructions and start taking Mucinex.    Instructed patient to return to clinic for worsening symptoms or symptoms that persist for 7 to 10 days  Supportive care, differential diagnoses, and indications for immediate follow-up discussed with patient.    Pathogenesis of diagnosis discussed including typical length and natural progression. Patient expresses understanding and agrees to plan.       Please note that this dictation was created using voice recognition software. I have made every reasonable attempt to correct obvious errors, but I expect that there are errors of grammar and possibly content that I did not discover before finalizing the note.

## 2020-02-15 ENCOUNTER — OFFICE VISIT (OUTPATIENT)
Dept: URGENT CARE | Facility: PHYSICIAN GROUP | Age: 35
End: 2020-02-15
Payer: COMMERCIAL

## 2020-02-15 VITALS
BODY MASS INDEX: 26.06 KG/M2 | DIASTOLIC BLOOD PRESSURE: 68 MMHG | SYSTOLIC BLOOD PRESSURE: 112 MMHG | HEIGHT: 67 IN | RESPIRATION RATE: 16 BRPM | TEMPERATURE: 98 F | HEART RATE: 80 BPM | OXYGEN SATURATION: 98 % | WEIGHT: 166 LBS

## 2020-02-15 DIAGNOSIS — J02.9 SORE THROAT: ICD-10-CM

## 2020-02-15 LAB
INT CON NEG: NORMAL
INT CON POS: NORMAL
S PYO AG THROAT QL: NEGATIVE

## 2020-02-15 PROCEDURE — 99214 OFFICE O/P EST MOD 30 MIN: CPT | Performed by: PHYSICIAN ASSISTANT

## 2020-02-15 PROCEDURE — 87880 STREP A ASSAY W/OPTIC: CPT | Performed by: PHYSICIAN ASSISTANT

## 2020-02-15 RX ORDER — NORGESTIMATE AND ETHINYL ESTRADIOL 0.25-0.035
KIT ORAL
COMMUNITY
Start: 2019-12-09 | End: 2021-09-30

## 2020-02-15 ASSESSMENT — ENCOUNTER SYMPTOMS
HOARSE VOICE: 0
COUGH: 0
TROUBLE SWALLOWING: 0
SORE THROAT: 1

## 2020-02-15 NOTE — PROGRESS NOTES
"Subjective:      Chely Pizarro is a 34 y.o. female who presents with Sore Throat (x2 days)    PMH:  has no past medical history on file.  MEDS:   Current Outpatient Medications:   •  MONO-LINYAH 0.25-35 MG-MCG per tablet, , Disp: , Rfl:   •  PANTOPRAZOLE SODIUM PO, Take  by mouth., Disp: , Rfl:   •  albuterol 108 (90 Base) MCG/ACT Aero Soln inhalation aerosol, Inhale 2 Puffs by mouth every 6 hours as needed for Shortness of Breath., Disp: 8.5 g, Rfl: 0  •  etonogestrel (NEXPLANON) 68 MG Implant implant, Inject 1 Each as instructed Once., Disp: , Rfl:   •  LOW-OGESTREL 0.3-30 MG-MCG Tab, , Disp: , Rfl:   ALLERGIES:   Allergies   Allergen Reactions   • Pcn [Penicillins] Rash     RASH     SURGHX:   Past Surgical History:   Procedure Laterality Date   • GYN SURGERY      labiaplasty     SOCHX:  reports that she has never smoked. She has never used smokeless tobacco. She reports current alcohol use of about 2.4 oz of alcohol per week. She reports that she does not use drugs.  FH: Reviewed with patient, not pertinent to this visit.           Patient presents with:  Sore Throat: pt has sore throat x on and off for \"a while\" pt has been taking pantoprazole for this with no change, she and daughter were exposed to strep throat yesterday would like to be swabbed.     Pharyngitis    This is a new problem. The current episode started yesterday. The problem has been unchanged. Neither side of throat is experiencing more pain than the other. There has been no fever. The pain is at a severity of 3/10. Pertinent negatives include no congestion, coughing, ear discharge, hoarse voice, plugged ear sensation or trouble swallowing. She has had exposure to strep. Treatments tried: pantoprazole. The treatment provided no relief.       Review of Systems   HENT: Positive for sore throat. Negative for congestion, ear discharge, hoarse voice and trouble swallowing.    Respiratory: Negative for cough.    All other systems reviewed and " "are negative.         Objective:     /68   Pulse 80   Temp 36.7 °C (98 °F) (Temporal)   Resp 16   Ht 1.702 m (5' 7\")   Wt 75.3 kg (166 lb)   SpO2 98%   BMI 26.00 kg/m²      Physical Exam  Vitals signs and nursing note reviewed.   Constitutional:       General: She is not in acute distress.     Appearance: Normal appearance. She is well-developed and normal weight. She is not toxic-appearing.   HENT:      Head: Normocephalic and atraumatic.      Right Ear: Tympanic membrane normal.      Left Ear: Tympanic membrane normal.      Nose: Nose normal.      Mouth/Throat:      Lips: Pink.      Mouth: Mucous membranes are moist.      Pharynx: No oropharyngeal exudate or posterior oropharyngeal erythema.   Eyes:      Extraocular Movements: Extraocular movements intact.      Conjunctiva/sclera: Conjunctivae normal.      Pupils: Pupils are equal, round, and reactive to light.   Neck:      Musculoskeletal: Normal range of motion and neck supple.   Cardiovascular:      Rate and Rhythm: Normal rate and regular rhythm.      Heart sounds: Normal heart sounds.   Pulmonary:      Effort: Pulmonary effort is normal.      Breath sounds: Normal breath sounds.   Abdominal:      Palpations: Abdomen is soft.   Musculoskeletal: Normal range of motion.   Lymphadenopathy:      Cervical: No cervical adenopathy.   Skin:     General: Skin is warm and dry.      Capillary Refill: Capillary refill takes less than 2 seconds.   Neurological:      General: No focal deficit present.      Mental Status: She is alert and oriented to person, place, and time.      Gait: Gait normal.   Psychiatric:         Mood and Affect: Mood normal.         Behavior: Behavior is cooperative.            Strep: neg     Assessment/Plan:     1. Sore throat  POCT Rapid Strep A     PT can continue OTC medications, increase fluids and rest until symptoms improve.     Motrin/Advil/Ibuprophen 600 mg every 6 hours as needed for pain or fever.'    PT advised saltwater " gargles/swishes  3-4 times daily until symptoms improve.     PT should follow up with PCP in 1-2 days for re-evaluation if symptoms have not improved.  Discussed red flags and reasons to return to UC or ED.  Pt and/or family verbalized understanding of diagnosis and follow up instructions and was offered informational handout on diagnosis.  PT discharged.

## 2020-12-08 ENCOUNTER — HOSPITAL ENCOUNTER (OUTPATIENT)
Dept: LAB | Facility: MEDICAL CENTER | Age: 35
End: 2020-12-08
Attending: OBSTETRICS & GYNECOLOGY
Payer: COMMERCIAL

## 2020-12-08 LAB — B-HCG SERPL-ACNC: ABNORMAL MIU/ML (ref 0–5)

## 2020-12-08 PROCEDURE — 84702 CHORIONIC GONADOTROPIN TEST: CPT

## 2020-12-08 PROCEDURE — 36415 COLL VENOUS BLD VENIPUNCTURE: CPT

## 2020-12-10 ENCOUNTER — HOSPITAL ENCOUNTER (OUTPATIENT)
Dept: LAB | Facility: MEDICAL CENTER | Age: 35
End: 2020-12-10
Attending: OBSTETRICS & GYNECOLOGY
Payer: COMMERCIAL

## 2020-12-10 LAB — B-HCG SERPL-ACNC: ABNORMAL MIU/ML (ref 0–5)

## 2020-12-10 PROCEDURE — 84702 CHORIONIC GONADOTROPIN TEST: CPT

## 2020-12-10 PROCEDURE — 36415 COLL VENOUS BLD VENIPUNCTURE: CPT

## 2021-07-28 ENCOUNTER — HOSPITAL ENCOUNTER (INPATIENT)
Facility: MEDICAL CENTER | Age: 36
LOS: 1 days | End: 2021-07-30
Attending: OBSTETRICS & GYNECOLOGY | Admitting: OBSTETRICS & GYNECOLOGY
Payer: COMMERCIAL

## 2021-07-28 ENCOUNTER — HOSPITAL ENCOUNTER (EMERGENCY)
Facility: MEDICAL CENTER | Age: 36
End: 2021-07-28
Attending: OBSTETRICS & GYNECOLOGY | Admitting: OBSTETRICS & GYNECOLOGY
Payer: COMMERCIAL

## 2021-07-28 VITALS
DIASTOLIC BLOOD PRESSURE: 80 MMHG | SYSTOLIC BLOOD PRESSURE: 127 MMHG | HEART RATE: 84 BPM | TEMPERATURE: 98 F | RESPIRATION RATE: 18 BRPM

## 2021-07-28 LAB
ALBUMIN SERPL BCP-MCNC: 3.5 G/DL (ref 3.2–4.9)
ALBUMIN/GLOB SERPL: 1.3 G/DL
ALP SERPL-CCNC: 159 U/L (ref 30–99)
ALT SERPL-CCNC: 8 U/L (ref 2–50)
ANION GAP SERPL CALC-SCNC: 10 MMOL/L (ref 7–16)
AST SERPL-CCNC: 12 U/L (ref 12–45)
BASOPHILS # BLD AUTO: 0.1 % (ref 0–1.8)
BASOPHILS # BLD: 0.01 K/UL (ref 0–0.12)
BILIRUB SERPL-MCNC: 0.2 MG/DL (ref 0.1–1.5)
BUN SERPL-MCNC: 8 MG/DL (ref 8–22)
CALCIUM SERPL-MCNC: 8.4 MG/DL (ref 8.5–10.5)
CHLORIDE SERPL-SCNC: 105 MMOL/L (ref 96–112)
CO2 SERPL-SCNC: 20 MMOL/L (ref 20–33)
CREAT SERPL-MCNC: 0.45 MG/DL (ref 0.5–1.4)
CREAT UR-MCNC: 98.83 MG/DL
EOSINOPHIL # BLD AUTO: 0.02 K/UL (ref 0–0.51)
EOSINOPHIL NFR BLD: 0.3 % (ref 0–6.9)
ERYTHROCYTE [DISTWIDTH] IN BLOOD BY AUTOMATED COUNT: 42.2 FL (ref 35.9–50)
GLOBULIN SER CALC-MCNC: 2.6 G/DL (ref 1.9–3.5)
GLUCOSE SERPL-MCNC: 79 MG/DL (ref 65–99)
HCT VFR BLD AUTO: 35.7 % (ref 37–47)
HGB BLD-MCNC: 12.2 G/DL (ref 12–16)
IMM GRANULOCYTES # BLD AUTO: 0.03 K/UL (ref 0–0.11)
IMM GRANULOCYTES NFR BLD AUTO: 0.4 % (ref 0–0.9)
LYMPHOCYTES # BLD AUTO: 1.28 K/UL (ref 1–4.8)
LYMPHOCYTES NFR BLD: 18.5 % (ref 22–41)
MCH RBC QN AUTO: 30.3 PG (ref 27–33)
MCHC RBC AUTO-ENTMCNC: 34.2 G/DL (ref 33.6–35)
MCV RBC AUTO: 88.8 FL (ref 81.4–97.8)
MONOCYTES # BLD AUTO: 0.42 K/UL (ref 0–0.85)
MONOCYTES NFR BLD AUTO: 6.1 % (ref 0–13.4)
NEUTROPHILS # BLD AUTO: 5.15 K/UL (ref 2–7.15)
NEUTROPHILS NFR BLD: 74.6 % (ref 44–72)
NRBC # BLD AUTO: 0 K/UL
NRBC BLD-RTO: 0 /100 WBC
PLATELET # BLD AUTO: 182 K/UL (ref 164–446)
PMV BLD AUTO: 11.1 FL (ref 9–12.9)
POTASSIUM SERPL-SCNC: 3.9 MMOL/L (ref 3.6–5.5)
PROT SERPL-MCNC: 6.1 G/DL (ref 6–8.2)
PROT UR-MCNC: 14 MG/DL (ref 0–15)
PROT/CREAT UR: 142 MG/G (ref 10–107)
RBC # BLD AUTO: 4.02 M/UL (ref 4.2–5.4)
SODIUM SERPL-SCNC: 135 MMOL/L (ref 135–145)
WBC # BLD AUTO: 6.9 K/UL (ref 4.8–10.8)

## 2021-07-28 PROCEDURE — 80053 COMPREHEN METABOLIC PANEL: CPT

## 2021-07-28 PROCEDURE — 82570 ASSAY OF URINE CREATININE: CPT

## 2021-07-28 PROCEDURE — 36415 COLL VENOUS BLD VENIPUNCTURE: CPT

## 2021-07-28 PROCEDURE — 99282 EMERGENCY DEPT VISIT SF MDM: CPT

## 2021-07-28 PROCEDURE — 85025 COMPLETE CBC W/AUTO DIFF WBC: CPT

## 2021-07-28 PROCEDURE — 59025 FETAL NON-STRESS TEST: CPT

## 2021-07-28 PROCEDURE — 84156 ASSAY OF PROTEIN URINE: CPT

## 2021-07-28 ASSESSMENT — FIBROSIS 4 INDEX: FIB4 SCORE: 0.84

## 2021-07-28 NOTE — ED PROVIDER NOTES
History and Physical      Chely Pizarro is a 35 y.o. female   at  39 weeks 4 days  who presents for  Contractions vaginal bleeding denies any headache nausea or vommiting.    Subjective:   positive  For CTXS.   positive Feels pain   negative for LOF  positive for vaginal bleeding.   positive for fetal movement    ROS: Constitutional: negative  Respiratory: negative  Cardiovascular: negative  Gastrointestinal: negative  Genitourinary:negative    No past medical history on file.  Past Surgical History:   Procedure Laterality Date   • GYN SURGERY      labiaplasty     No family history on file.  OB History    Para Term  AB Living   2             SAB TAB Ectopic Molar Multiple Live Births                    # Outcome Date GA Lbr Shane/2nd Weight Sex Delivery Anes PTL Lv   2 Current            1               Social History     Socioeconomic History   • Marital status:      Spouse name: Not on file   • Number of children: Not on file   • Years of education: Not on file   • Highest education level: Not on file   Occupational History   • Not on file   Tobacco Use   • Smoking status: Never Smoker   • Smokeless tobacco: Never Used   Vaping Use   • Vaping Use: Never used   Substance and Sexual Activity   • Alcohol use: Yes     Alcohol/week: 2.4 oz     Types: 4 Glasses of wine per week   • Drug use: No   • Sexual activity: Yes     Partners: Male     Birth control/protection: Pill   Other Topics Concern   • Not on file   Social History Narrative   • Not on file     Social Determinants of Health     Financial Resource Strain:    • Difficulty of Paying Living Expenses:    Food Insecurity:    • Worried About Running Out of Food in the Last Year:    • Ran Out of Food in the Last Year:    Transportation Needs:    • Lack of Transportation (Medical):    • Lack of Transportation (Non-Medical):    Physical Activity:    • Days of Exercise per Week:    • Minutes of Exercise per Session:    Stress:     • Feeling of Stress :    Social Connections:    • Frequency of Communication with Friends and Family:    • Frequency of Social Gatherings with Friends and Family:    • Attends Protestant Services:    • Active Member of Clubs or Organizations:    • Attends Club or Organization Meetings:    • Marital Status:    Intimate Partner Violence:    • Fear of Current or Ex-Partner:    • Emotionally Abused:    • Physically Abused:    • Sexually Abused:      Allergies: Pcn [penicillins]  No current facility-administered medications for this encounter.    Prenatal care with   starting at first trimester  with following problems:  Patient Active Problem List    Diagnosis Date Noted   • Routine health maintenance 11/14/2017   • Encounter for Papanicolaou smear of cervix 11/14/2017   • Pregnancy 11/14/2017   • Neck pain 10/18/2017   • Fatigue 10/18/2017               Objective:      There were no vitals taken for this visit.    General:   no acute distress, alert, cooperative   Skin:   normal   HEENT:  PERRLA   Lungs:   CTA bilateral   Heart:   S1, S2 normal, no murmur, click, rub or gallop, regular rate and rhythm, brisk carotid upstroke without bruits, peripheral pulses very brisk, chest is clear without rales or wheezing, no pedal edema, no JVD, no hepatosplenomegaly   Abdomen:   gravid, NT   EFW:  3200 gms.   Pelvis:  adequate with gynecoid pelvis   FHT:  146 BPM   Uterine Size: S=D   Presentations: Cephalic   Cervix:     Dilation: 3cm    Effacement: 75%    Station:  -3    Consistency: Medium    Position: Anterior     Lab Review  Lab:   Blood type: O     Recent Results (from the past 5880 hour(s))   HCG QUANTITATIVE    Collection Time: 12/08/20  1:31 PM   Result Value Ref Range    [facility] [place] [address].0 (H) 0.0 - 5.0 mIU/mL   HCG QUANTITATIVE    Collection Time: 12/10/20  2:52 PM   Result Value Ref Range    [facility] [place] [address].0 (H) 0.0 - 5.0 mIU/mL   PROTEIN/CREAT RATIO URINE    Collection Time: 07/28/21  8:22 AM   Result Value Ref  Range    Total Protein, Urine 14.0 0.0 - 15.0 mg/dL    Creatinine, Random Urine 98.83 mg/dL    Protein Creatinine Ratio 142 (H) 10 - 107 mg/g   CBC WITH DIFFERENTIAL    Collection Time: 07/28/21  8:58 AM   Result Value Ref Range    WBC 6.9 4.8 - 10.8 K/uL    RBC 4.02 (L) 4.20 - 5.40 M/uL    Hemoglobin 12.2 12.0 - 16.0 g/dL    Hematocrit 35.7 (L) 37.0 - 47.0 %    MCV 88.8 81.4 - 97.8 fL    MCH 30.3 27.0 - 33.0 pg    MCHC 34.2 33.6 - 35.0 g/dL    RDW 42.2 35.9 - 50.0 fL    Platelet Count 182 164 - 446 K/uL    MPV 11.1 9.0 - 12.9 fL    Neutrophils-Polys 74.60 (H) 44.00 - 72.00 %    Lymphocytes 18.50 (L) 22.00 - 41.00 %    Monocytes 6.10 0.00 - 13.40 %    Eosinophils 0.30 0.00 - 6.90 %    Basophils 0.10 0.00 - 1.80 %    Immature Granulocytes 0.40 0.00 - 0.90 %    Nucleated RBC 0.00 /100 WBC    Neutrophils (Absolute) 5.15 2.00 - 7.15 K/uL    Lymphs (Absolute) 1.28 1.00 - 4.80 K/uL    Monos (Absolute) 0.42 0.00 - 0.85 K/uL    Eos (Absolute) 0.02 0.00 - 0.51 K/uL    Baso (Absolute) 0.01 0.00 - 0.12 K/uL    Immature Granulocytes (abs) 0.03 0.00 - 0.11 K/uL    NRBC (Absolute) 0.00 K/uL   Comp Metabolic Panel    Collection Time: 07/28/21  8:58 AM   Result Value Ref Range    Sodium 135 135 - 145 mmol/L    Potassium 3.9 3.6 - 5.5 mmol/L    Chloride 105 96 - 112 mmol/L    Co2 20 20 - 33 mmol/L    Anion Gap 10.0 7.0 - 16.0    Glucose 79 65 - 99 mg/dL    Bun 8 8 - 22 mg/dL    Creatinine 0.45 (L) 0.50 - 1.40 mg/dL    Calcium 8.4 (L) 8.5 - 10.5 mg/dL    AST(SGOT) 12 12 - 45 U/L    ALT(SGPT) 8 2 - 50 U/L    Alkaline Phosphatase 159 (H) 30 - 99 U/L    Total Bilirubin 0.2 0.1 - 1.5 mg/dL    Albumin 3.5 3.2 - 4.9 g/dL    Total Protein 6.1 6.0 - 8.2 g/dL    Globulin 2.6 1.9 - 3.5 g/dL    A-G Ratio 1.3 g/dL   ESTIMATED GFR    Collection Time: 07/28/21  8:58 AM   Result Value Ref Range    GFR If African American >60 >60 mL/min/1.73 m 2    GFR If Non African American >60 >60 mL/min/1.73 m 2        Assessment:   Chely Pizarro 34 y/o   at  39 weeks 4 days SMN1 Carrier partner negative.  Labor status: Early latent labor.  Obstetrical history significant for   Patient Active Problem List    Diagnosis Date Noted   • Routine health maintenance 2017   • Encounter for Papanicolaou smear of cervix 2017   • Pregnancy 2017   • Neck pain 10/18/2017   • Fatigue 10/18/2017   .      Plan:     Admit to L&D  GBS negative.  PIH labs.

## 2021-07-28 NOTE — PROGRESS NOTES
PT presenting with CO irregular UC's and bleeding. SVE per doc flow (old blood noted on glove)  +FM, denies LOF.    0945 [place] at BS, per MD if labs are normal and SVE unchanged, okay to DC    1040 Message left for Dr Guthrie. PT DC, to return for LOF, VB, decreased FM or change in UC frequency or plain.

## 2021-07-29 LAB
BASOPHILS # BLD AUTO: 0.2 % (ref 0–1.8)
BASOPHILS # BLD: 0.02 K/UL (ref 0–0.12)
EOSINOPHIL # BLD AUTO: 0.03 K/UL (ref 0–0.51)
EOSINOPHIL NFR BLD: 0.3 % (ref 0–6.9)
ERYTHROCYTE [DISTWIDTH] IN BLOOD BY AUTOMATED COUNT: 42.6 FL (ref 35.9–50)
ERYTHROCYTE [DISTWIDTH] IN BLOOD BY AUTOMATED COUNT: 42.7 FL (ref 35.9–50)
HCT VFR BLD AUTO: 36.1 % (ref 37–47)
HCT VFR BLD AUTO: 41.5 % (ref 37–47)
HGB BLD-MCNC: 12.2 G/DL (ref 12–16)
HGB BLD-MCNC: 14.1 G/DL (ref 12–16)
HOLDING TUBE BB 8507: NORMAL
IMM GRANULOCYTES # BLD AUTO: 0.05 K/UL (ref 0–0.11)
IMM GRANULOCYTES NFR BLD AUTO: 0.5 % (ref 0–0.9)
LYMPHOCYTES # BLD AUTO: 2.2 K/UL (ref 1–4.8)
LYMPHOCYTES NFR BLD: 23.2 % (ref 22–41)
MCH RBC QN AUTO: 30.2 PG (ref 27–33)
MCH RBC QN AUTO: 30.4 PG (ref 27–33)
MCHC RBC AUTO-ENTMCNC: 33.8 G/DL (ref 33.6–35)
MCHC RBC AUTO-ENTMCNC: 34 G/DL (ref 33.6–35)
MCV RBC AUTO: 89.4 FL (ref 81.4–97.8)
MCV RBC AUTO: 89.4 FL (ref 81.4–97.8)
MONOCYTES # BLD AUTO: 0.53 K/UL (ref 0–0.85)
MONOCYTES NFR BLD AUTO: 5.6 % (ref 0–13.4)
NEUTROPHILS # BLD AUTO: 6.66 K/UL (ref 2–7.15)
NEUTROPHILS NFR BLD: 70.2 % (ref 44–72)
NRBC # BLD AUTO: 0 K/UL
NRBC BLD-RTO: 0 /100 WBC
PLATELET # BLD AUTO: 176 K/UL (ref 164–446)
PLATELET # BLD AUTO: 201 K/UL (ref 164–446)
PMV BLD AUTO: 10.7 FL (ref 9–12.9)
PMV BLD AUTO: 10.7 FL (ref 9–12.9)
RBC # BLD AUTO: 4.04 M/UL (ref 4.2–5.4)
RBC # BLD AUTO: 4.64 M/UL (ref 4.2–5.4)
SARS-COV+SARS-COV-2 AG RESP QL IA.RAPID: NOTDETECTED
SARS-COV-2 RNA RESP QL NAA+PROBE: NOTDETECTED
SPECIMEN SOURCE: NORMAL
SPECIMEN SOURCE: NORMAL
WBC # BLD AUTO: 10.4 K/UL (ref 4.8–10.8)
WBC # BLD AUTO: 9.5 K/UL (ref 4.8–10.8)

## 2021-07-29 PROCEDURE — 770002 HCHG ROOM/CARE - OB PRIVATE (112)

## 2021-07-29 PROCEDURE — 59409 OBSTETRICAL CARE: CPT

## 2021-07-29 PROCEDURE — 90707 MMR VACCINE SC: CPT | Performed by: OBSTETRICS & GYNECOLOGY

## 2021-07-29 PROCEDURE — 3E0134Z INTRODUCTION OF SERUM, TOXOID AND VACCINE INTO SUBCUTANEOUS TISSUE, PERCUTANEOUS APPROACH: ICD-10-PCS | Performed by: OBSTETRICS & GYNECOLOGY

## 2021-07-29 PROCEDURE — 36415 COLL VENOUS BLD VENIPUNCTURE: CPT

## 2021-07-29 PROCEDURE — A9270 NON-COVERED ITEM OR SERVICE: HCPCS | Performed by: OBSTETRICS & GYNECOLOGY

## 2021-07-29 PROCEDURE — 700111 HCHG RX REV CODE 636 W/ 250 OVERRIDE (IP): Performed by: OBSTETRICS & GYNECOLOGY

## 2021-07-29 PROCEDURE — 90471 IMMUNIZATION ADMIN: CPT

## 2021-07-29 PROCEDURE — U0003 INFECTIOUS AGENT DETECTION BY NUCLEIC ACID (DNA OR RNA); SEVERE ACUTE RESPIRATORY SYNDROME CORONAVIRUS 2 (SARS-COV-2) (CORONAVIRUS DISEASE [COVID-19]), AMPLIFIED PROBE TECHNIQUE, MAKING USE OF HIGH THROUGHPUT TECHNOLOGIES AS DESCRIBED BY CMS-2020-01-R: HCPCS

## 2021-07-29 PROCEDURE — 700102 HCHG RX REV CODE 250 W/ 637 OVERRIDE(OP): Performed by: OBSTETRICS & GYNECOLOGY

## 2021-07-29 PROCEDURE — 304965 HCHG RECOVERY SERVICES

## 2021-07-29 PROCEDURE — 85025 COMPLETE CBC W/AUTO DIFF WBC: CPT

## 2021-07-29 PROCEDURE — 85027 COMPLETE CBC AUTOMATED: CPT

## 2021-07-29 PROCEDURE — 700111 HCHG RX REV CODE 636 W/ 250 OVERRIDE (IP)

## 2021-07-29 PROCEDURE — U0005 INFEC AGEN DETEC AMPLI PROBE: HCPCS

## 2021-07-29 PROCEDURE — 87426 SARSCOV CORONAVIRUS AG IA: CPT

## 2021-07-29 PROCEDURE — 59409 OBSTETRICAL CARE: CPT | Performed by: OBSTETRICS & GYNECOLOGY

## 2021-07-29 RX ORDER — OXYCODONE HYDROCHLORIDE AND ACETAMINOPHEN 5; 325 MG/1; MG/1
1 TABLET ORAL EVERY 4 HOURS PRN
Status: DISCONTINUED | OUTPATIENT
Start: 2021-07-29 | End: 2021-07-30 | Stop reason: HOSPADM

## 2021-07-29 RX ORDER — IBUPROFEN 600 MG/1
600 TABLET ORAL EVERY 6 HOURS PRN
Status: DISCONTINUED | OUTPATIENT
Start: 2021-07-29 | End: 2021-07-30 | Stop reason: HOSPADM

## 2021-07-29 RX ORDER — ONDANSETRON 2 MG/ML
4 INJECTION INTRAMUSCULAR; INTRAVENOUS EVERY 6 HOURS PRN
Status: DISCONTINUED | OUTPATIENT
Start: 2021-07-29 | End: 2021-07-30 | Stop reason: HOSPADM

## 2021-07-29 RX ORDER — ONDANSETRON 4 MG/1
4 TABLET, ORALLY DISINTEGRATING ORAL EVERY 6 HOURS PRN
Status: DISCONTINUED | OUTPATIENT
Start: 2021-07-29 | End: 2021-07-30 | Stop reason: HOSPADM

## 2021-07-29 RX ORDER — PHYTONADIONE 2 MG/ML
INJECTION, EMULSION INTRAMUSCULAR; INTRAVENOUS; SUBCUTANEOUS
Status: ACTIVE
Start: 2021-07-29 | End: 2021-07-29

## 2021-07-29 RX ORDER — SODIUM CHLORIDE, SODIUM LACTATE, POTASSIUM CHLORIDE, CALCIUM CHLORIDE 600; 310; 30; 20 MG/100ML; MG/100ML; MG/100ML; MG/100ML
INJECTION, SOLUTION INTRAVENOUS PRN
Status: DISCONTINUED | OUTPATIENT
Start: 2021-07-29 | End: 2021-07-30 | Stop reason: HOSPADM

## 2021-07-29 RX ORDER — ERYTHROMYCIN 5 MG/G
OINTMENT OPHTHALMIC
Status: ACTIVE
Start: 2021-07-29 | End: 2021-07-29

## 2021-07-29 RX ORDER — MISOPROSTOL 200 UG/1
800 TABLET ORAL
Status: DISCONTINUED | OUTPATIENT
Start: 2021-07-29 | End: 2021-07-29 | Stop reason: HOSPADM

## 2021-07-29 RX ORDER — ROPIVACAINE HYDROCHLORIDE 2 MG/ML
INJECTION, SOLUTION EPIDURAL; INFILTRATION; PERINEURAL
Status: ACTIVE
Start: 2021-07-29 | End: 2021-07-29

## 2021-07-29 RX ORDER — SODIUM CHLORIDE, SODIUM LACTATE, POTASSIUM CHLORIDE, CALCIUM CHLORIDE 600; 310; 30; 20 MG/100ML; MG/100ML; MG/100ML; MG/100ML
INJECTION, SOLUTION INTRAVENOUS CONTINUOUS
Status: DISCONTINUED | OUTPATIENT
Start: 2021-07-29 | End: 2021-07-29

## 2021-07-29 RX ORDER — METHYLERGONOVINE MALEATE 0.2 MG/ML
0.2 INJECTION INTRAVENOUS
Status: DISCONTINUED | OUTPATIENT
Start: 2021-07-29 | End: 2021-07-30 | Stop reason: HOSPADM

## 2021-07-29 RX ORDER — OXYCODONE HYDROCHLORIDE AND ACETAMINOPHEN 5; 325 MG/1; MG/1
2 TABLET ORAL EVERY 4 HOURS PRN
Status: DISCONTINUED | OUTPATIENT
Start: 2021-07-29 | End: 2021-07-30 | Stop reason: HOSPADM

## 2021-07-29 RX ADMIN — IBUPROFEN 600 MG: 600 TABLET ORAL at 13:01

## 2021-07-29 RX ADMIN — IBUPROFEN 600 MG: 600 TABLET ORAL at 21:01

## 2021-07-29 RX ADMIN — OXYCODONE HYDROCHLORIDE AND ACETAMINOPHEN 2 TABLET: 5; 325 TABLET ORAL at 04:11

## 2021-07-29 RX ADMIN — OXYTOCIN 125 ML/HR: 10 INJECTION, SOLUTION INTRAMUSCULAR; INTRAVENOUS at 04:14

## 2021-07-29 RX ADMIN — MEASLES, MUMPS, AND RUBELLA VIRUS VACCINE LIVE 0.5 ML: 1000; 12500; 1000 INJECTION, POWDER, LYOPHILIZED, FOR SUSPENSION SUBCUTANEOUS at 11:01

## 2021-07-29 RX ADMIN — OXYTOCIN 2000 ML/HR: 10 INJECTION, SOLUTION INTRAMUSCULAR; INTRAVENOUS at 02:57

## 2021-07-29 ASSESSMENT — EDINBURGH POSTNATAL DEPRESSION SCALE (EPDS)
I HAVE BEEN SO UNHAPPY THAT I HAVE HAD DIFFICULTY SLEEPING: NOT AT ALL
THINGS HAVE BEEN GETTING ON TOP OF ME: NO, I HAVE BEEN COPING AS WELL AS EVER
I HAVE FELT SAD OR MISERABLE: NO, NOT AT ALL
I HAVE BEEN SO UNHAPPY THAT I HAVE BEEN CRYING: NO, NEVER
I HAVE LOOKED FORWARD WITH ENJOYMENT TO THINGS: AS MUCH AS I EVER DID
I HAVE BEEN ANXIOUS OR WORRIED FOR NO GOOD REASON: HARDLY EVER
THE THOUGHT OF HARMING MYSELF HAS OCCURRED TO ME: NEVER
I HAVE BEEN ABLE TO LAUGH AND SEE THE FUNNY SIDE OF THINGS: AS MUCH AS I ALWAYS COULD
I HAVE BLAMED MYSELF UNNECESSARILY WHEN THINGS WENT WRONG: NOT VERY OFTEN
I HAVE FELT SCARED OR PANICKY FOR NO GOOD REASON: NO, NOT AT ALL

## 2021-07-29 ASSESSMENT — LIFESTYLE VARIABLES: EVER_SMOKED: NEVER

## 2021-07-29 ASSESSMENT — PATIENT HEALTH QUESTIONNAIRE - PHQ9
2. FEELING DOWN, DEPRESSED, IRRITABLE, OR HOPELESS: NOT AT ALL
SUM OF ALL RESPONSES TO PHQ9 QUESTIONS 1 AND 2: 0
1. LITTLE INTEREST OR PLEASURE IN DOING THINGS: NOT AT ALL

## 2021-07-29 ASSESSMENT — PAIN DESCRIPTION - PAIN TYPE
TYPE: ACUTE PAIN
TYPE: ACUTE PAIN

## 2021-07-29 NOTE — L&D DELIVERY NOTE
Normal Spontaneous Vaginal Delivery    Date of procedure: 2021  PreOp Dx: 39w4d wk IUP in active labor with spont ruptured membranes  PostOp Dx: Same with delivery of a viable female infant at 0247 hours weighing TBA as pt skin to skin at the time of writing; with APGARS of 8 and 8 and 1 and 5min respectively.  Procedure: Spontaneous vaginal delivery  Surgeon: Elías Sewell DO  Assistants: none  Anesthesia: none  EBL: 250 cc  Indications: This is a 35 y.o.  at 39w4d weeks by LMP with an EDC of Estimated Date of Delivery: 21.   She is an established patient of Dr. Guthrie and presented in active precipitous labor.   Labor course: arrived at 4cm but changed rapidly from 6-7 to complete and pushing uncontrollably within minutes.    Procedure: The patient was noted to be complete so was placed in dorsal lithotomy position, prepped and draped in the usual sterile fashion for delivery. The patient was asked to push and the head was delivered spontaneously in the cephalic direct OA restituting to ANGEL position over and intact perineum. A nuchal cord was checked and a tight nuchal was delivered through. The anterior shoulder delivered easily and the posterior shoulder followed. The remainder of the infant was easily delivered and the oropharynx and nasopharynx was bulb suctioned. The infant was noted to have spontaneous cry and spontaneous movement of all four extremities. The cord was clamped x 2 and cut - it was noted to have 3 vessels. The infant was passed to the mother and  nursing/NICU personnel was in attendance. The placenta delivered intact spontaneously and the uterus was evacuated of clots. Pitocin 20 units in 1L was started to firm the uterus. Examination of cervix and vaginal vault revealed no laceration.     The patient tolerated this procedure well and recovered in L&D with her infant. All sponge, needle, and instrument counts were correct.        -------------------------------------------------------------------------------------------------------------  Elías Sewell D.O.

## 2021-07-29 NOTE — PROGRESS NOTES
0145: Bedside report received from Danni ALVA.     0200: SROM    0243: Pt reports she feels like she needs to push: SVE 6-7/100/+2.    0245: RN called Dr. Guthrie and provider did not answer.     0246: Pt states she is pushing; SVE 10/100/+3. Dr. Sewell at bedside for delivery.     0247:  liveborn female.     0325: Dr. Guthrie at bedside to assess pt; orders to give percocet as provider thinks elevated blood pressures are pain related; orders to keep pt on L&D for 2 hours after giving percocet.    0615: Pt transferred to postpartum room 319. Bedside report given to Chante ALVA on postpartum.

## 2021-07-29 NOTE — PROGRESS NOTES
Patient admitted to unit to room 319 from L&D. Received report from Shaunna ALVA. Assumed care of patient. Assessment complete. Fundus is firm, with light lochia rubra. Pain 3/10, denies pain meds at this time. Patient and FOB oriented to room and procedures, emergency light, call bell, infant I&O sheet, infant sleep safety, identification badges, and to call for assistance to bathroom. ID bands verified with L&D RN, cuddles on an blinking. Patient and FOB verbalized understanding, all questions addressed and answered. Bed is locked and in low position. Call light left within reach and encouraged to call for any needs if necessary.

## 2021-07-29 NOTE — CARE PLAN
The patient is Stable - Low risk of patient condition declining or worsening    Shift Goals  Clinical Goals: maintain normal vitals and light lochia    Progress made toward(s) clinical / shift goals:  patient has met all discharge goals and plans to discharge at 24 hours post delivery at 0300 in the morning tomorrow.    Patient is not progressing towards the following goals:

## 2021-07-29 NOTE — DISCHARGE INSTRUCTIONS
PATIENT DISCHARGE EDUCATION INSTRUCTION SHEET  REASONS TO CALL YOUR OBSTETRICIAN  · Persistent fever, shaking, chills (Temperature higher than 100.4) may indicate you have an infection  · Heavy bleeding: soaking more than 1 pad per hour; Passing clots an egg-sized clot or bigger may mean you have an postpartum hemorrhage  · Foul odor from vagina or bad smelling or discolored discharge or blood  · Breast infection (Mastitis symptoms); breast pain, chills, fever, redness or red streaks, may feel flu like symptoms  · Urinary pain, burning or frequency  · Incision that is not healing, increased redness, swelling, tenderness or pain, or any pus from episiotomy or  site may mean you have an infection  · Redness, swelling, warmth, or painful to touch in the calf area of your leg may mean you have a blood clot  · Severe or intensified depression, thoughts or feelings of wanting to hurt yourself or someone else   · Pain in chest, obstructed breathing or shortness of breath (trouble catching your breath) may mean you are having a postpartum complication. Call your provider immediately   · Headache that does not get better, even after taking medicine, a bad headache with vision changes or pain in the upper right area of your belly may mean you have high blood pressure or post birth preeclampsia. Call your provider immediately    HAND WASHING  All family and friends should wash their hands:  · Before and after holding the baby  · Before feeding the baby  · After using the restroom or changing the baby's diaper    WOUND CARE  Ask your physician for additional care instructions. In general:  ·  Incision:  · May shower and pat incision dry   · Keep the incision clean and dry  · There should not be any opening or pus from the incision  · Continue to walk at home 3 times a day   · Do NOT lift anything heavier than your baby (over 10 pounds)  · Encourage family to help participate in care of the  to allow  rest and mom time to heal  · Episiotomy/Laceration  · May use lisa-spray bottle, witch hazel pads and dermaplast spray for comfort  · Use lisa-spray bottle after urinating to cleanse perineal area  · To prevent burning during urination spray lisa-water bottle on labial area   · Pat perineal area dry until episiotomy/laceration is healed  · Continue to use lisa-bottle until bleeding stops as needed  · If have a 2nd degree laceration or greater, a Sitz bath can offer relief from soreness, burning, and inflammation   · Sitz Bath   · Sit in 6 inches of warm water and soak laceration as needed until the laceration heals    VAGINAL CARE AND BLEEDING  · Nothing inside vagina for 6 weeks:   · No sexual intercourse, tampons or douching  · Bleeding may continue for 2-4 weeks. Amount and color may vary  · Soaking 1 pad or more in an hour for several hours is considered heavy bleeding  · Passing large egg sized blood clots can be concerning  · If you feel like you have heavy bleeding or are having increasing amount of blood clots call your Obstetrician immediately  · If you begin feeling faint upon standing, feeling sick to your stomach, have clammy skin, a really fast heartbeat, have chills, start feeling confused, dizzy, sleepy or weak, or feeling like you're going to faint call your Obstetrician immediately    HYPERTENSION   Preeclampsia or gestational hypertension are types of high blood pressure that only pregnant women can get. It is important for you to be aware of symptoms to seek early intervention and treatment. If you have any of these symptoms immediately call your Obstetrician    · Vision changes or blurred vision   · Severe headache or pain that is unrelieved with medication and will not go away  · Persistent pain in upper abdomen or shoulder   · Increased swelling of face, feet, or hands  · Difficulty breathing or shortness of breath at rest  · Urinating less than usual    URINATION AND BOWEL MOVEMENTS  · Eating  "more fiber (bran cereal, fruits, and vegetables) and drinking plenty of fluids will help to avoid constipation  · Urinary frequency and urgency after childbirth is normal  · If you experience any urinary pain, burning or frequency call your provider    BIRTH CONTROL  · It is possible to become pregnant at any time after delivery and while breastfeeding  · Plan to discuss a method of birth control with your physician at your post delivery follow up visit    POSTPARTUM BLUES  During the first few days after birth, you may experience a sense of the \"blues\" which may include impatience, irritability or even crying. These feelings come and go quickly. However, as many as 1 in 10 women experience emotional symptoms known as postpartum depression.     POSTPARTUM DEPRESSION    May start as early as the second or third day after delivery or take several weeks or months to develop. Symptoms of \"blues\" are present, but are more intense: Crying spells; loss of appetite; feelings of hopelessness or loss of control; fear of touching the baby; over concern or no concern at all about the baby; little or no concern about your own appearance/caring for yourself; and/or inability to sleep or excessive sleeping. Contact your Obstetrician if you are experiencing any of these symptoms     PREVENTING SHAKEN BABY  If you are angry or stressed, PUT THE BABY IN THE CRIB, step away, take some deep breaths, and wait until you are calm to care for the baby. DO NOT SHAKE THE BABY. You are not alone, call a supporter for help.  · [facility] 24/7 crisis call line (008-995-3448) or (1-670.226.1976)  · You can also text them, text \"ANSWER\" (364430)      "

## 2021-07-29 NOTE — PROGRESS NOTES
PT came back in labor and delivered precipitously.   baby with mom breast feeding.   had labile elevated blod pressure in labor.  Vitals:    07/29/21 0047   BP: 126/82   Pulse: 80   Resp:    Temp:    SpO2:      Abdomen- uterus below umbillicus well contracted.  P/e- minimal bleeding.   plan- observe b/p for 2hrs percocet for pain.

## 2021-07-29 NOTE — DISCHARGE SUMMARY
Discharge Summary:      Chely Pizarro    Admit Date:   2021  Discharge Date:  2021     Admitting diagnosis:  Labor and delivery, indication for care [O75.9]  Discharge Diagnosis: Status post vaginal, spontaneous.  Pregnancy Complications: none  Tubal Ligation:  no        History:  No past medical history on file.  OB History    Para Term  AB Living   6 4 4     4   SAB TAB Ectopic Molar Multiple Live Births           0 1      # Outcome Date GA Lbr Shane/2nd Weight Sex Delivery Anes PTL Lv   6 Term 21 39w4d 01:16 / 00:01 3.215 kg (7 lb 1.4 oz) F Vag-Spont None N KIMBERLY   5             4             3 Term            2 Term            1 Term                 Pcn [penicillins]  Patient Active Problem List    Diagnosis Date Noted   • Routine health maintenance 2017   • Encounter for Papanicolaou smear of cervix 2017   • Pregnancy 2017   • Neck pain 10/18/2017   • Fatigue 10/18/2017        Hospital Course:   35 y.o. , now para 4, was admitted with the above mentioned diagnosis, underwent Active Labor, vaginal, spontaneous. Patient postpartum course was unremarkable, with progressive advancement in diet , ambulation and toleration of oral analgesia. Patient without complaints today and desires discharge.      Vitals:    21 0532 21 0600 21 1000 21 1423   BP: 130/88 136/80 113/73 127/75   Pulse: 75 64 79 68   Resp:  18   Temp: 36.3 °C (97.3 °F) 36.1 °C (96.9 °F) 36.1 °C (97 °F) 36.2 °C (97.2 °F)   TempSrc: Temporal Temporal Temporal Temporal   SpO2:  97% 98% 96%   Weight:       Height:           Current Facility-Administered Medications   Medication Dose   • ondansetron (ZOFRAN ODT) dispertab 4 mg  4 mg    Or   • ondansetron (ZOFRAN) syringe/vial injection 4 mg  4 mg   • oxytocin (PITOCIN) infusion (for postpartum)   mL/hr   • ibuprofen (MOTRIN) tablet 600 mg  600 mg   • oxyCODONE-acetaminophen (PERCOCET) 5-325 MG  per tablet 1 tablet  1 tablet   • oxyCODONE-acetaminophen (PERCOCET) 5-325 MG per tablet 2 tablet  2 tablet   • LR infusion     • PRN oxytocin (PITOCIN) (20 Units/1000 mL) PRN for excessive uterine bleeding - See Admin Instr  125-999 mL/hr   • methylergonovine (METHERGINE) injection 0.2 mg  0.2 mg       Exam:  Breast Exam: negative  Abdomen: Abdomen soft, non-tender. BS normal. No masses,  No organomegaly  Fundus Non Tender: no  Perineum: perineum intact  Extremity: extremities, peripheral pulses and reflexes normal     Labs:  Recent Labs     07/28/21  0858 07/29/21  0210 07/29/21  1044   WBC 6.9 9.5 10.4   RBC 4.02* 4.64 4.04*   HEMOGLOBIN 12.2 14.1 12.2   HEMATOCRIT 35.7* 41.5 36.1*   MCV 88.8 89.4 89.4   MCH 30.3 30.4 30.2   MCHC 34.2 34.0 33.8   RDW 42.2 42.7 42.6   PLATELETCT 182 201 176   MPV 11.1 10.7 10.7        Activity:   Discharge to home  Pelvic Rest x 6 weeks    Assessment:  normal postpartum course  Discharge Assessment: No heavy bleeding or foul vaginal discharge      Follow up: [facility] in 6 weeks for vaginal check.      Discharge Meds:   No current outpatient medications on file.       Riky Guthrie M.D.

## 2021-07-29 NOTE — LACTATION NOTE
This note was copied from a baby's chart.  Baby 39.4 weeks, Mother reports she  previous babies for 1 year each without any issues. Mother declines lactation assistance at this time.

## 2021-07-30 VITALS
RESPIRATION RATE: 18 BRPM | OXYGEN SATURATION: 98 % | TEMPERATURE: 97 F | HEIGHT: 67 IN | SYSTOLIC BLOOD PRESSURE: 125 MMHG | WEIGHT: 180 LBS | BODY MASS INDEX: 28.25 KG/M2 | DIASTOLIC BLOOD PRESSURE: 73 MMHG | HEART RATE: 70 BPM

## 2021-07-30 NOTE — PROGRESS NOTES
2100  Assumed care of patient. Assessment complete. Fundus is firm, with light lochia rubra. Pain level is 4/10, medicated per MAR. Discharge paperwork has been reviewed by previous RN and signed. Patient states she has no questions and confirms that she has her copies. Waiting for 24 hour testing to be completed later tonight. RN stated that we will need carseat prior to leaving unit, mother verbalized understanding. Bed is locked and in low position. Call light left within reach and encouraged to call for any needs if necessary.     0300  Paperwork signed and parents have copies. Car seat checked in room prior to leaving. Escorted patient and  down to car by CNA.

## 2021-08-19 ENCOUNTER — HOSPITAL ENCOUNTER (OUTPATIENT)
Dept: RADIOLOGY | Facility: MEDICAL CENTER | Age: 36
End: 2021-08-19
Attending: OBSTETRICS & GYNECOLOGY
Payer: COMMERCIAL

## 2021-08-19 DIAGNOSIS — N64.4 MASTODYNIA: ICD-10-CM

## 2021-08-19 PROCEDURE — 76642 ULTRASOUND BREAST LIMITED: CPT | Mod: RT

## 2021-09-30 ENCOUNTER — HOSPITAL ENCOUNTER (OUTPATIENT)
Dept: RADIOLOGY | Facility: MEDICAL CENTER | Age: 36
End: 2021-09-30
Attending: EMERGENCY MEDICINE
Payer: COMMERCIAL

## 2021-09-30 ENCOUNTER — TELEPHONE (OUTPATIENT)
Dept: URGENT CARE | Facility: PHYSICIAN GROUP | Age: 36
End: 2021-09-30

## 2021-09-30 ENCOUNTER — OFFICE VISIT (OUTPATIENT)
Dept: URGENT CARE | Facility: PHYSICIAN GROUP | Age: 36
End: 2021-09-30
Payer: COMMERCIAL

## 2021-09-30 VITALS
SYSTOLIC BLOOD PRESSURE: 112 MMHG | HEIGHT: 67 IN | DIASTOLIC BLOOD PRESSURE: 62 MMHG | OXYGEN SATURATION: 97 % | TEMPERATURE: 98 F | HEART RATE: 75 BPM | BODY MASS INDEX: 25.9 KG/M2 | WEIGHT: 165 LBS | RESPIRATION RATE: 20 BRPM

## 2021-09-30 DIAGNOSIS — R42 VERTIGO, INTERMITTENT: ICD-10-CM

## 2021-09-30 DIAGNOSIS — R51.9 INCREASED FREQUENCY OF HEADACHES: ICD-10-CM

## 2021-09-30 PROCEDURE — 70450 CT HEAD/BRAIN W/O DYE: CPT

## 2021-09-30 PROCEDURE — 99213 OFFICE O/P EST LOW 20 MIN: CPT | Performed by: EMERGENCY MEDICINE

## 2021-09-30 RX ORDER — DROSPIRENONE 4 MG/1
TABLET, FILM COATED ORAL
COMMUNITY
Start: 2021-09-10 | End: 2021-12-09

## 2021-09-30 ASSESSMENT — ENCOUNTER SYMPTOMS
FOCAL WEAKNESS: 0
BLURRED VISION: 0
DIZZINESS: 1
NAUSEA: 0
SENSORY CHANGE: 0
PHOTOPHOBIA: 1
HEADACHES: 1
LOSS OF BALANCE: 0
VOMITING: 0

## 2021-09-30 ASSESSMENT — FIBROSIS 4 INDEX: FIB4 SCORE: 0.87

## 2021-09-30 NOTE — PROGRESS NOTES
"Subjective     Chelykristen Pizarro is a 35 y.o. female who presents with Dizziness (onset 2x months; ) and Head Ache (onset 1.5x wk;)            Dizziness  This is a new problem. Episode onset: over one week. The problem occurs daily. The problem has been waxing and waning. Associated symptoms include headaches. Pertinent negatives include no nausea or vomiting. The symptoms are aggravated by bending.   Headache   This is a recurrent problem. Episode onset: about 8 months. Associated symptoms include photophobia. Pertinent negatives include no blurred vision, hearing loss, loss of balance, nausea, tinnitus or vomiting. Her past medical history is significant for migraines in the family. There is no history of recent head traumas.   Notes intermittent vertiginous sensation for the last nearly 2 weeks.  Abrupt onset while walking.  Notes some worsening with bending over.  Also notes increased frequency of headaches associated with pregnancy, and more frequent in the last several weeks.  Breast-feeding.  Review of Systems   HENT: Negative for hearing loss and tinnitus.    Eyes: Positive for photophobia. Negative for blurred vision.   Gastrointestinal: Negative for nausea and vomiting.   Neurological: Positive for headaches. Negative for sensory change, focal weakness and loss of balance.              Objective     /62 (BP Location: Right arm, Patient Position: Sitting, BP Cuff Size: Adult)   Pulse 75   Temp 36.7 °C (98 °F) (Temporal)   Resp 20   Ht 1.702 m (5' 7\")   Wt 74.8 kg (165 lb)   SpO2 97%   BMI 25.84 kg/m²      Physical Exam  Constitutional:       General: She is not in acute distress.     Appearance: She is well-developed. She is not ill-appearing.   HENT:      Head: Normocephalic and atraumatic.      Right Ear: Tympanic membrane and ear canal normal.      Left Ear: Tympanic membrane and ear canal normal.      Nose: No mucosal edema or rhinorrhea.      Mouth/Throat:      Lips: Pink.      Mouth: " Mucous membranes are moist.      Pharynx: No oropharyngeal exudate or posterior oropharyngeal erythema.      Comments: Negative Hallpike maneuver  Eyes:      General: Lids are normal.      Extraocular Movements: Extraocular movements intact.      Conjunctiva/sclera: Conjunctivae normal.      Pupils: Pupils are equal, round, and reactive to light.   Neck:      Vascular: No carotid bruit.      Trachea: Trachea and phonation normal.   Cardiovascular:      Rate and Rhythm: Normal rate and regular rhythm.      Heart sounds: Normal heart sounds.   Pulmonary:      Effort: Pulmonary effort is normal.      Breath sounds: Normal breath sounds.   Musculoskeletal:      Cervical back: Neck supple.   Lymphadenopathy:      Cervical: No cervical adenopathy.   Skin:     General: Skin is warm and dry.   Neurological:      Mental Status: She is alert.      Cranial Nerves: No facial asymmetry.      Motor: No weakness, abnormal muscle tone or pronator drift.      Coordination: Romberg sign negative. Finger-Nose-Finger Test normal.      Gait: Gait normal.   Psychiatric:         Behavior: Behavior is cooperative.                             Assessment & Plan        1. Increased frequency of headaches  OTC Tylenol prn due to breast-feeding status  - CT-HEAD W/O; Interpretation per radiologist:   FINDINGS: There is no evidence of mass or mass effect. CSF spaces are normal. There is no periventricular chronic small vessel ischemic change present. There is no intracranial hemorrhage seen. The calvarium is intact. There is no scalp injury. There is no evidence of sinus disease.     - AMB REFERRAL TO ESTABLISH WITH [facility]  - REFERRAL TO NEUROLOGY    2. Vertigo, intermittent  - AMB REFERRAL TO ESTABLISH WITH [facility]  - REFERRAL TO NEUROLOGY

## 2021-10-01 NOTE — TELEPHONE ENCOUNTER
Please notify patient of CT scan performed normal.  Referral to PCP and neurology placed.  Use OTC Tylenol as needed.

## 2021-11-05 ENCOUNTER — HOSPITAL ENCOUNTER (OUTPATIENT)
Dept: LAB | Facility: MEDICAL CENTER | Age: 36
End: 2021-11-05
Attending: OBSTETRICS & GYNECOLOGY
Payer: COMMERCIAL

## 2021-11-05 LAB — TSH SERPL DL<=0.005 MIU/L-ACNC: 1.24 UIU/ML (ref 0.38–5.33)

## 2021-11-05 PROCEDURE — 84443 ASSAY THYROID STIM HORMONE: CPT

## 2021-11-05 PROCEDURE — 36415 COLL VENOUS BLD VENIPUNCTURE: CPT

## 2021-11-08 ENCOUNTER — HOSPITAL ENCOUNTER (OUTPATIENT)
Facility: MEDICAL CENTER | Age: 36
End: 2021-11-08
Attending: OBSTETRICS & GYNECOLOGY | Admitting: OBSTETRICS & GYNECOLOGY
Payer: COMMERCIAL

## 2021-11-10 ENCOUNTER — OFFICE VISIT (OUTPATIENT)
Dept: NEUROLOGY | Facility: MEDICAL CENTER | Age: 36
End: 2021-11-10
Attending: PSYCHIATRY & NEUROLOGY
Payer: COMMERCIAL

## 2021-11-10 VITALS
BODY MASS INDEX: 26.37 KG/M2 | HEIGHT: 67 IN | DIASTOLIC BLOOD PRESSURE: 72 MMHG | OXYGEN SATURATION: 100 % | WEIGHT: 167.99 LBS | HEART RATE: 71 BPM | SYSTOLIC BLOOD PRESSURE: 116 MMHG

## 2021-11-10 DIAGNOSIS — R55 PRE-SYNCOPE: ICD-10-CM

## 2021-11-10 PROCEDURE — 99211 OFF/OP EST MAY X REQ PHY/QHP: CPT | Performed by: PSYCHIATRY & NEUROLOGY

## 2021-11-10 PROCEDURE — 99204 OFFICE O/P NEW MOD 45 MIN: CPT | Performed by: PSYCHIATRY & NEUROLOGY

## 2021-11-10 ASSESSMENT — FIBROSIS 4 INDEX: FIB4 SCORE: 0.87

## 2021-11-10 NOTE — PROGRESS NOTES
"[facility] NEUROLOGY  GENERAL NEUROLOGY  NEW PATIENT VISIT    Referral source: Saurabh Darling MD    CC: \"vertigo, increased frequency of headaches\"    HISTORY OF ILLNESS:  Chely Pizarro is a 35 y.o. woman with an unremarkable medical history.  Today, she was unaccompanied, and she provided the following history:    7/29/2021:  Chely delivered her fourth child.  She has been breastfeeding since that time.    8/2021:  Chely first experienced \"vertigo.\"  She was showing a house and she felt as if there was an earthquake.  Her equilibrium was \"off.\"  The sensation lasted seconds.  There were no associated palpitations, clamminess, vision changes, etc.    Later, she noticed \"dizzy spells\" provoked by turning her head or moving her eyes.  She experienced several of these episodes daily.  Each one lasts moments (seconds).  There is no sensation of \"room spinning.\"  She doesn't feel \"faint.\"  There are no palpitations.  There is no visual darkening, constriction of visual fields, or double vision.  She has not noticed any changes in hearing or ear pain.  There is no tinnitus.    10/27/2021:  Chely was driving.  She suddenly became \"very dizzy.\"  Her heart started pounding, her hands became clammy, and she had to pull over.  She hadn't eaten that morning.  After drinking and eating she was able to resume driving.    10/31/2021:  Chely was lying down watching a movie when she started feeling \"dizzy\" again.  She called EMS.  She had eaten well prior to this event.    Chely has \"anxiety\" related to being a mother, but she has never been diagnosed with an anxiety-related disorder.    Since that time Chely stopped her birth control and stopped drinking coffee.  She stays well-hydrated.  Her symptoms have been a bit better lately.    Now, she experiencing anxiety provoked by driving onto the highway.    MEDICAL AND SURGICAL HISTORY:  History reviewed. No pertinent past medical history.  Past Surgical History:   Procedure " "Laterality Date   • GYN SURGERY      labiaplasty     MEDICATIONS:  Current Outpatient Medications   Medication Sig   • SLYND 4 MG Tab  (Patient not taking: Reported on 11/10/2021)     SOCIAL HISTORY:  Social History     Tobacco Use   • Smoking status: Never Smoker   • Smokeless tobacco: Never Used   Substance Use Topics   • Alcohol use: Not Currently     Alcohol/week: 2.4 oz     Types: 4 Glasses of wine per week     Social History     Social History Narrative   • Not on file     FAMILY HISTORY:  History reviewed. No pertinent family history.  REVIEW OF SYSTEMS:  A ROS was completed.  Pertinent positives and negatives were included in the HPI, above.  All other systems were reviewed and are negative.    PHYSICAL EXAM:  General/Medical:  - NAD  - hair, skin, nails, and joints were normal    Neuro:  MENTAL STATUS: awake and alert; no deficits of speech or language; oriented to person, place, and time; affect was appropriate to situation; pleasant, cooperative    CRANIAL NERVES:    II: acuity: J1+/J1+ with contacts, fields: intact to confrontation, pupils: 6/6 to 4/4 without a relative afferent pupillary defect, discs: sharp    III/IV/VI: versions: intact without nystagmus, saccades: intact without evidence of MAEGAN, saccade testing reproduced \"dizziness\" symptoms    V: facial sensation: symmetric to light touch    VII: facial expression: symmetric    VIII: hearing: intact to finger rub    IX/X: palate: elevates symmetrically    XI: shoulder shrug: symmetric    XII: tongue: midline    MOTOR:  - bulk: normal throughout  - tone: normal throughout  Upper Extremity Strength  (R/L)    5/5   Elbow flexion 5/5   Elbow extension 5/5   Shoulder abduction 5/5     Lower Extremity Strength  (R/L)   Hip flexion 5/5   Knee extension 5/5   Knee flexion 5/5   Ankle plantarflexion 5/5   Ankle dorsiflexion 5/5     - can walk on toes and heels  - pronator drift: absent  - abnormal movements: none    SENSATION:  - light touch: grossly " "intact over the upper and lower extremities  - vibration (R/L, seconds): NT/NT at the great toes  - pinprick: NT  - proprioception: NT  - Romberg: absent    COORDINATION:  - finger to nose: normal, no ataxia on exam  - finger tapping: rapid and accurate, bilaterally    REFLEXES:  Reflex Right Left   BR 2+ 2+   Biceps 2+ 2+   Triceps 2+ 2+   Patellae 2+ 2+   Achilles 2+ 2+   Toes NT NT     GAIT:  - narrow base and normal  - heel-raised/toe-raised gait: intact/intact  - tandem gait: intact    REVIEW OF IMAGING STUDIES: I reviewed the following studies:  MRI Brain:  Date: 9/30/2021  W/o and w/ contrast?: without  Indication: \"dizziness\"  Comparison: none  Impression:  \"No evidence of acute intracranial process.\"    REVIEW OF LABORATORY STUDIES:  11/5/2021:  - TSH: 1.240    ASSESSMENT:  Chely Pizarro is a 35 y.o. woman with pre-syncope and subjective \"dizziness.\"  Her symptoms do not fit nicely into a single diagnostic category.  Some of her symptoms seem consistent with pre-syncope, and we discussed tactics to manage this (summarized below).  She also reports \"dizziness\" with eye movements, which is difficult for me to explain.  Plans/recommenadtions as follows:    PLAN:  Pre-Syncope:  - ensure adequate hydration  - consider taking in more electrolytes (salts, etc.) to promote adequate blood volume  - if an episode starts, lower the head and raise the feet (if possible)  - could consider wearing compression stockings to promote volume return from the lower extremities to the core  - monitor symptoms  - contact the clinic if symptoms worsen or if new symptoms arise    Follow-Up:  - No follow-ups on file.    Signed: Jim Herzog M.D.    BILLING DOCUMENTATION:   I spent 52 minutes reviewing the medical record, interviewing and examining the patient, discussing my impression (see \"assessment\" above), and coordinating care.  "

## 2021-11-12 ENCOUNTER — APPOINTMENT (OUTPATIENT)
Dept: ADMISSIONS | Facility: MEDICAL CENTER | Age: 36
End: 2021-11-12
Payer: COMMERCIAL

## 2021-12-07 ENCOUNTER — TELEPHONE (OUTPATIENT)
Dept: SCHEDULING | Facility: IMAGING CENTER | Age: 36
End: 2021-12-07

## 2021-12-09 ENCOUNTER — OFFICE VISIT (OUTPATIENT)
Dept: MEDICAL GROUP | Facility: PHYSICIAN GROUP | Age: 36
End: 2021-12-09
Payer: COMMERCIAL

## 2021-12-09 VITALS
BODY MASS INDEX: 26.84 KG/M2 | TEMPERATURE: 98 F | RESPIRATION RATE: 16 BRPM | DIASTOLIC BLOOD PRESSURE: 60 MMHG | OXYGEN SATURATION: 97 % | SYSTOLIC BLOOD PRESSURE: 104 MMHG | HEART RATE: 85 BPM | WEIGHT: 171 LBS | HEIGHT: 67 IN

## 2021-12-09 DIAGNOSIS — Z76.89 ENCOUNTER TO ESTABLISH CARE: ICD-10-CM

## 2021-12-09 DIAGNOSIS — R42 VERTIGO: ICD-10-CM

## 2021-12-09 DIAGNOSIS — F41.9 ANXIETY: ICD-10-CM

## 2021-12-09 PROBLEM — M54.2 NECK PAIN: Status: RESOLVED | Noted: 2017-10-18 | Resolved: 2021-12-09

## 2021-12-09 PROBLEM — Z12.4 ENCOUNTER FOR PAPANICOLAOU SMEAR OF CERVIX: Status: RESOLVED | Noted: 2017-11-14 | Resolved: 2021-12-09

## 2021-12-09 PROBLEM — Z34.90 PREGNANCY: Status: RESOLVED | Noted: 2017-11-14 | Resolved: 2021-12-09

## 2021-12-09 PROBLEM — Z00.00 ROUTINE HEALTH MAINTENANCE: Status: RESOLVED | Noted: 2017-11-14 | Resolved: 2021-12-09

## 2021-12-09 PROCEDURE — 99215 OFFICE O/P EST HI 40 MIN: CPT | Performed by: FAMILY MEDICINE

## 2021-12-09 RX ORDER — SERTRALINE HYDROCHLORIDE 25 MG/1
50 TABLET, FILM COATED ORAL
COMMUNITY
Start: 2021-11-15 | End: 2022-02-28 | Stop reason: SDUPTHER

## 2021-12-09 ASSESSMENT — FIBROSIS 4 INDEX: FIB4 SCORE: 0.89

## 2021-12-09 NOTE — PROGRESS NOTES
Subjective:     CC: Here to establish care and discuss issues.    HPI:   Chely presents today with the following medical concerns:    Encounter to establish care  Patient is here today to establish care.  Since August she has been having problems with vertigo and what sounds like anxiety.  She states she will get the vertigo attacks seem to make her very anxious and nervous that she might pass out particularly when she has been driving.  She feels her heart racing at that point.  It finally comes down and things are better.  She has seeing an ear nose and throat and has another appointment scheduled in a month.  She is also seeing a neurologist and he did not find anything to explain her symptoms.  She did state that this last pregnancy that ended in August was different than the rest and that she had much more headaches than normal and just did not feel quite right.  She is still breast-feeding.  She was seen in the emergency room at [facility] and she states that all the lab work and EKG were normal.  Her gynecologist did talk to her about all of this and did start her on sertraline.  She states that seems to have helped a little bit with the anxiety over driving but has not made her symptoms completely resolved.  She does have a history of heartburn particularly during the pregnancy.  She had treated previously been on a PPI medication but stopped it.  She not currently having any heartburn but she will get a warm flushed feeling that goes through her body which is new as well.  She has cut caffeine out of her life and lower carb intake.  She also is trying to hydrate better and eat frequent small meals.  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    Vertigo  This is a new problem.  It started back in August.  She is having intermittent episodes of vertigo and dizziness.  She has been evaluated by the ear nose and throat at this time.  She has had a CT scan of her  "head that was normal.  Labs which were said to be normal.  Sometimes the dizziness can be triggered by movement of the head and other times it comes on without warning.    Anxiety  This is a new problem since August.  Patient is getting anxiety attacks over her possible vertigo happens particularly when she is driving.  She has been put on the sertraline it seems to be better but not completely resolved.      History reviewed. No pertinent past medical history.    Social History     Tobacco Use   • Smoking status: Never Smoker   • Smokeless tobacco: Never Used   Vaping Use   • Vaping Use: Never used   Substance Use Topics   • Alcohol use: Not Currently   • Drug use: No       Current Outpatient Medications Ordered in Epic   Medication Sig Dispense Refill   • sertraline (ZOLOFT) 25 MG tablet        No current Epic-ordered facility-administered medications on file.       Allergies:  Pcn [penicillins]    Health Maintenance: Completed    ROS:  Gen: no fevers/chills, no changes in weight  Eyes: no changes in vision  ENT: no sore throat, no hearing loss, no bloody nose  Pulm: no sob, no cough  CV: no chest pain, no palpitations  GI: no nausea/vomiting, no diarrhea  : no dysuria  MSk: no myalgias  Skin: no rash  Neuro: no headaches, no numbness/tingling      Objective:       Exam:  /60 (BP Location: Right arm, Patient Position: Sitting, BP Cuff Size: Adult)   Pulse 85   Temp 36.7 °C (98 °F) (Temporal)   Resp 16   Ht 1.702 m (5' 7\")   Wt 77.6 kg (171 lb)   SpO2 97%   Breastfeeding Yes   BMI 26.78 kg/m²  Body mass index is 26.78 kg/m².    Gen: Alert and oriented, No apparent distress.  Ears:    Ear canals and TMs are normal.  Neck: Neck is supple without lymphadenopathy.  CV: Regular rate and rhythm. No murmurs, rubs, or gallops.  Ext: No clubbing, cyanosis, edema.  Neuro: Mostly intact.  Psych: She is alert and oriented x3.  No unusual thought process expressed.  Insight and judgment appears good.  Does not " appear to be overtly depressed or anxious on today's visit.    Labs: Labs in the chart reviewed.    Assessment & Plan:     36 y.o. female with the following -     1. Encounter to establish care  Patient establish care with me today.  She is current on her exams and labs.  We will recheck her in a year for baseline issues.  General healthy diet discussed.    2. Vertigo  This is a new problem since August.  She is to continue work-up with a ear nose and throat doctor.  In the meantime she could try home Epley maneuvers.  I suspect that she is having benign positional vertigo which is triggering anxiety and panic feelings.  The next time it does happen she is to try to take her pulse to see if it is too fast or too slow or irregular.  If it is she is to let me know.    3. Anxiety  This is a new problem.  Patient was told I recommend she increase her sertraline to 50 mg a day.  She is waiting to hear back from her gynecologist before doing that.      Return in about 1 year (around 12/9/2022) for annual exam.  42 minutes spent with the patient.  Please note that this dictation was created using voice recognition software. I have made every reasonable attempt to correct obvious errors, but I expect that there are errors of grammar and possibly content that I did not discover before finalizing the note.

## 2021-12-09 NOTE — ASSESSMENT & PLAN NOTE
This is a new problem.  It started back in August.  She is having intermittent episodes of vertigo and dizziness.  She has been evaluated by the ear nose and throat at this time.  She has had a CT scan of her head that was normal.  Labs which were said to be normal.  Sometimes the dizziness can be triggered by movement of the head and other times it comes on without warning.

## 2021-12-12 ENCOUNTER — HOSPITAL ENCOUNTER (EMERGENCY)
Facility: MEDICAL CENTER | Age: 36
End: 2021-12-12
Attending: EMERGENCY MEDICINE
Payer: COMMERCIAL

## 2021-12-12 VITALS
BODY MASS INDEX: 25.9 KG/M2 | WEIGHT: 165 LBS | RESPIRATION RATE: 18 BRPM | TEMPERATURE: 97 F | HEIGHT: 67 IN | OXYGEN SATURATION: 97 % | SYSTOLIC BLOOD PRESSURE: 125 MMHG | DIASTOLIC BLOOD PRESSURE: 84 MMHG | HEART RATE: 79 BPM

## 2021-12-12 DIAGNOSIS — R00.2 PALPITATIONS: ICD-10-CM

## 2021-12-12 LAB
ALBUMIN SERPL BCP-MCNC: 4.4 G/DL (ref 3.2–4.9)
ALBUMIN/GLOB SERPL: 1.8 G/DL
ALP SERPL-CCNC: 62 U/L (ref 30–99)
ALT SERPL-CCNC: 23 U/L (ref 2–50)
ANION GAP SERPL CALC-SCNC: 12 MMOL/L (ref 7–16)
AST SERPL-CCNC: 24 U/L (ref 12–45)
BASOPHILS # BLD AUTO: 0.7 % (ref 0–1.8)
BASOPHILS # BLD: 0.04 K/UL (ref 0–0.12)
BILIRUB SERPL-MCNC: <0.2 MG/DL (ref 0.1–1.5)
BUN SERPL-MCNC: 19 MG/DL (ref 8–22)
CALCIUM SERPL-MCNC: 9 MG/DL (ref 8.5–10.5)
CHLORIDE SERPL-SCNC: 105 MMOL/L (ref 96–112)
CO2 SERPL-SCNC: 19 MMOL/L (ref 20–33)
CREAT SERPL-MCNC: 0.69 MG/DL (ref 0.5–1.4)
EKG IMPRESSION: NORMAL
EOSINOPHIL # BLD AUTO: 0.06 K/UL (ref 0–0.51)
EOSINOPHIL NFR BLD: 1 % (ref 0–6.9)
ERYTHROCYTE [DISTWIDTH] IN BLOOD BY AUTOMATED COUNT: 38.7 FL (ref 35.9–50)
GLOBULIN SER CALC-MCNC: 2.5 G/DL (ref 1.9–3.5)
GLUCOSE SERPL-MCNC: 98 MG/DL (ref 65–99)
HCT VFR BLD AUTO: 38.6 % (ref 37–47)
HGB BLD-MCNC: 13.5 G/DL (ref 12–16)
IMM GRANULOCYTES # BLD AUTO: 0.02 K/UL (ref 0–0.11)
IMM GRANULOCYTES NFR BLD AUTO: 0.3 % (ref 0–0.9)
LYMPHOCYTES # BLD AUTO: 2.36 K/UL (ref 1–4.8)
LYMPHOCYTES NFR BLD: 40.3 % (ref 22–41)
MCH RBC QN AUTO: 30.5 PG (ref 27–33)
MCHC RBC AUTO-ENTMCNC: 35 G/DL (ref 33.6–35)
MCV RBC AUTO: 87.1 FL (ref 81.4–97.8)
MONOCYTES # BLD AUTO: 0.38 K/UL (ref 0–0.85)
MONOCYTES NFR BLD AUTO: 6.5 % (ref 0–13.4)
NEUTROPHILS # BLD AUTO: 3 K/UL (ref 2–7.15)
NEUTROPHILS NFR BLD: 51.2 % (ref 44–72)
NRBC # BLD AUTO: 0 K/UL
NRBC BLD-RTO: 0 /100 WBC
PLATELET # BLD AUTO: 229 K/UL (ref 164–446)
PMV BLD AUTO: 10.5 FL (ref 9–12.9)
POTASSIUM SERPL-SCNC: 4.1 MMOL/L (ref 3.6–5.5)
PROT SERPL-MCNC: 6.9 G/DL (ref 6–8.2)
RBC # BLD AUTO: 4.43 M/UL (ref 4.2–5.4)
SODIUM SERPL-SCNC: 136 MMOL/L (ref 135–145)
TROPONIN T SERPL-MCNC: <6 NG/L (ref 6–19)
WBC # BLD AUTO: 5.9 K/UL (ref 4.8–10.8)

## 2021-12-12 PROCEDURE — A9270 NON-COVERED ITEM OR SERVICE: HCPCS

## 2021-12-12 PROCEDURE — 85025 COMPLETE CBC W/AUTO DIFF WBC: CPT

## 2021-12-12 PROCEDURE — 700102 HCHG RX REV CODE 250 W/ 637 OVERRIDE(OP)

## 2021-12-12 PROCEDURE — 93005 ELECTROCARDIOGRAM TRACING: CPT | Performed by: EMERGENCY MEDICINE

## 2021-12-12 PROCEDURE — 93005 ELECTROCARDIOGRAM TRACING: CPT

## 2021-12-12 PROCEDURE — 36415 COLL VENOUS BLD VENIPUNCTURE: CPT

## 2021-12-12 PROCEDURE — 80053 COMPREHEN METABOLIC PANEL: CPT

## 2021-12-12 PROCEDURE — 99284 EMERGENCY DEPT VISIT MOD MDM: CPT

## 2021-12-12 PROCEDURE — 84484 ASSAY OF TROPONIN QUANT: CPT

## 2021-12-12 RX ORDER — METOPROLOL SUCCINATE 25 MG/1
25 TABLET, EXTENDED RELEASE ORAL ONCE
Status: COMPLETED | OUTPATIENT
Start: 2021-12-12 | End: 2021-12-12

## 2021-12-12 RX ORDER — METOPROLOL SUCCINATE 25 MG/1
25 TABLET, EXTENDED RELEASE ORAL 2 TIMES DAILY
Qty: 60 TABLET | Refills: 0 | Status: SHIPPED | OUTPATIENT
Start: 2021-12-12 | End: 2022-01-11

## 2021-12-12 RX ADMIN — METOPROLOL SUCCINATE 25 MG: 25 TABLET, EXTENDED RELEASE ORAL at 04:38

## 2021-12-12 ASSESSMENT — FIBROSIS 4 INDEX: FIB4 SCORE: 0.89

## 2021-12-12 NOTE — ED TRIAGE NOTES
"Chief Complaint   Patient presents with   • Irregular Heart Beat       BIB EMS to [facility], pt on monitor and in gown, labs drawn and sent. Pt consists of: Pt was asleep and woke up feeling like her HR was very fast.  Called EMS, they stated that while she was on their monitors it did go up to the 140's, then went back down.  Pt states she has a hx of anxiety and takes zoloft.  Pt is 4 months postpartum    Medications given en route: 2mg versed, and 500mL NS    /85   Pulse 73   Temp 35.9 °C (96.7 °F) (Temporal)   Resp 18   Ht 1.702 m (5' 7\")   Wt 74.8 kg (165 lb)   SpO2 97%   BMI 25.84 kg/m²   "

## 2021-12-12 NOTE — ED PROVIDER NOTES
ED Provider Note    Scribed for Byron Bucio by Marguerite Delacruz. 12/12/2021  3:20 AM    Primary care provider: Gilberto Willingham III, M.D.  Means of arrival: EMS  History obtained from: Patient  History limited by: None    CHIEF COMPLAINT  Chief Complaint   Patient presents with    Irregular Heart Beat       HPI  Chely Pizarro is a 36 y.o. female who presents to the Emergency Department via EMS for evaluation of acute heart palpitations onset prior to arrival. She woke up from sleep feeling as if she was having a very fast heart rate. Recently this has happened about three times a week. Tonight her heart rate reached 140, which was the highest it's been. She has been evaluated for this and was diagnosed with panic attacks, but she reports that she does not feel anxious during these episodes. Currently she takes 25 mg Zoloft for anxiety. When she is having palpitations she is also feeling short of breath. Additionally she reports that she has been extremely dizzy ever since her daughter was born in July. She has been seen by her PCP, ENT, and a neurologist for this. No smoking or alcohol use.     REVIEW OF SYSTEMS  As above, all other systems reviewed and are negative.   See HPI for further details.     PAST MEDICAL HISTORY       SURGICAL HISTORY   has a past surgical history that includes gyn surgery.    SOCIAL HISTORY  Social History     Tobacco Use    Smoking status: Never Smoker    Smokeless tobacco: Never Used   Vaping Use    Vaping Use: Never used   Substance Use Topics    Alcohol use: Not Currently    Drug use: No      Social History     Substance and Sexual Activity   Drug Use No       FAMILY HISTORY  No family history pertinent.    CURRENT MEDICATIONS  Current Outpatient Medications   Medication Instructions    sertraline (ZOLOFT) 25 MG tablet No dose, route, or frequency recorded.       ALLERGIES  Allergies   Allergen Reactions    Pcn [Penicillins] Rash     RASH       PHYSICAL EXAM  VITAL SIGNS:  "  Vitals:    12/12/21 0300 12/12/21 0310 12/12/21 0314 12/12/21 0404   BP:  141/85 141/85 125/84   Pulse:  73 78 79   Resp:  18 20 18   Temp:  35.9 °C (96.7 °F)  36.1 °C (97 °F)   TempSrc:  Temporal  Temporal   SpO2:  97% 98% 97%   Weight: 74.8 kg (165 lb)      Height: 1.702 m (5' 7\")          Vitals: My interpretation: normotensive, not tachycardic, afebrile, not hypoxic    Reinterpretation of vitals: Within normal limits    PE:   Constitutional: Well developed, Well nourished, No acute distress, Non-toxic appearance.   HENT: Normocephalic, Atraumatic, Bilateral external ears normal, Oropharynx is clear mucous membranes are moist. No oral exudates or nasal discharge.   Eyes: Pupils are equal round and reactive, EOMI, Conjunctiva normal, No discharge.   Neck: Normal range of motion, No tenderness, Supple, No stridor. No meningismus.  Lymphatic: No lymphadenopathy noted.   Cardiovascular: Regular rate and rhythm without murmur rub or gallop.  Thorax & Lungs: Clear breath sounds bilaterally without wheezes, rhonchi or rales. There is no chest wall tenderness.   Abdomen: Soft non-tender non-distended. There is no rebound or guarding. No organomegaly is appreciated. Bowel sounds are normal.  Skin: Normal without rash.   Back: No CVA or spinal tenderness.   Extremities: Intact distal pulses, No edema, No tenderness, No cyanosis, No clubbing. Capillary refill is less than 2 seconds.  Musculoskeletal: Good range of motion in all major joints. No tenderness to palpation or major deformities noted.   Neurologic: Alert & oriented x 3, Normal motor function, Normal sensory function, No focal deficits noted. Reflexes are normal.  Psychiatric: Affect normal, Judgment normal, Mood normal. There is no suicidal ideation or patient reported hallucinations.     DIAGNOSTIC STUDIES / PROCEDURES    LABS  Results for orders placed or performed during the hospital encounter of 12/12/21   CBC WITH DIFFERENTIAL   Result Value Ref Range    " WBC 5.9 4.8 - 10.8 K/uL    RBC 4.43 4.20 - 5.40 M/uL    Hemoglobin 13.5 12.0 - 16.0 g/dL    Hematocrit 38.6 37.0 - 47.0 %    MCV 87.1 81.4 - 97.8 fL    MCH 30.5 27.0 - 33.0 pg    MCHC 35.0 33.6 - 35.0 g/dL    RDW 38.7 35.9 - 50.0 fL    Platelet Count 229 164 - 446 K/uL    MPV 10.5 9.0 - 12.9 fL    Neutrophils-Polys 51.20 44.00 - 72.00 %    Lymphocytes 40.30 22.00 - 41.00 %    Monocytes 6.50 0.00 - 13.40 %    Eosinophils 1.00 0.00 - 6.90 %    Basophils 0.70 0.00 - 1.80 %    Immature Granulocytes 0.30 0.00 - 0.90 %    Nucleated RBC 0.00 /100 WBC    Neutrophils (Absolute) 3.00 2.00 - 7.15 K/uL    Lymphs (Absolute) 2.36 1.00 - 4.80 K/uL    Monos (Absolute) 0.38 0.00 - 0.85 K/uL    Eos (Absolute) 0.06 0.00 - 0.51 K/uL    Baso (Absolute) 0.04 0.00 - 0.12 K/uL    Immature Granulocytes (abs) 0.02 0.00 - 0.11 K/uL    NRBC (Absolute) 0.00 K/uL   COMP METABOLIC PANEL   Result Value Ref Range    Sodium 136 135 - 145 mmol/L    Potassium 4.1 3.6 - 5.5 mmol/L    Chloride 105 96 - 112 mmol/L    Co2 19 (L) 20 - 33 mmol/L    Anion Gap 12.0 7.0 - 16.0    Glucose 98 65 - 99 mg/dL    Bun 19 8 - 22 mg/dL    Creatinine 0.69 0.50 - 1.40 mg/dL    Calcium 9.0 8.5 - 10.5 mg/dL    AST(SGOT) 24 12 - 45 U/L    ALT(SGPT) 23 2 - 50 U/L    Alkaline Phosphatase 62 30 - 99 U/L    Total Bilirubin <0.2 0.1 - 1.5 mg/dL    Albumin 4.4 3.2 - 4.9 g/dL    Total Protein 6.9 6.0 - 8.2 g/dL    Globulin 2.5 1.9 - 3.5 g/dL    A-G Ratio 1.8 g/dL   TROPONIN   Result Value Ref Range    Troponin T <6 6 - 19 ng/L   ESTIMATED GFR   Result Value Ref Range    GFR If African American >60 >60 mL/min/1.73 m 2    GFR If Non African American >60 >60 mL/min/1.73 m 2   EKG   Result Value Ref Range    Report       [facility] Emergency Dept.    Test Date:  2021  Pt Name:    KARI BASURTO                  Department: ER  MRN:        [id number]                      Room:        22  Gender:     Female                       Technician: [id number]  :         [id number]                   Requested By:ER TRIAGE PROTOCOL  Order #:    [id number]                    Reading MD: Byron Bucio    Measurements  Intervals                                Axis  Rate:       80                           P:          36  NM:         168                          QRS:        -18  QRSD:       102                          T:          11  QT:         416  QTc:        480    Interpretive Statements  SINUS RHYTHM  BORDERLINE LEFT AXIS DEVIATION  ARTIFACT IN LEAD(S) I,II,aVR,aVL,aVF  No previous ECG available for comparison  Electronically Signed On 12- 3:36:16 PST by Byron Bucio        All labs reviewed by me. Significant for no leukocytosis, no anemia, normal electrolytes, normal renal function, normal liver enzymes and bilirubin.    COURSE & MEDICAL DECISION MAKING  Nursing notes, VS, PMSFHx, labs, imaging, EKG reviewed in chart.    Heart Score: Low risk    MDM: 3:20 AM Chely Pizarro is a 36 y.o. female who presented with a few months of episodic palpitations and feeling occasionally lightheaded and dizzy.  She woke up from sleep tonight, anxious, and felt like her heart was racing.  She did not have any syncope at this time.  EMS that her heart rate was elevated but she seemed very anxious and they gave her 2 of Versed and this improved.  Her work-up was completely negative including troponin, EKG, CBC, CMP.  No signs of anemia.  She denies drug or alcohol use.  She was watched here for several hours on the monitor and although she states she had episodes of palpitations, she had no arrhythmia or dysrhythmia on the monitor or significant tachycardia.  I discussed with her that she needs to follow-up with a cardiologist.  Will start her on low-dose metoprolol to help with the symptoms from the palpitations.  She is well-appearing at time of discharge, no acute distress.  Heart score is low risk and she has no signs of hyperthyroidism, caffeine use, or drug use.  She  verbalized understanding strict return precautions and outpatient follow-up plans with cardiology.    The patient will return for new or worsening symptoms and is stable at the time of discharge.    DISPOSITION:  Patient will be discharged home in stable condition.    FINAL IMPRESSION  1. Palpitations Acute       Marguerite ABREU (Scribe), am scribing for, and in the presence of, Byron Bucio.    Electronically signed by: Marguerite Delacruz (Scribe), 12/12/2021    IByron personally performed the services described in this documentation, as scribed by Marguerite Delacruz in my presence, and it is both accurate and complete.    The note accurately reflects work and decisions made by me.  Byron Bucio  12/12/2021  4:59 AM

## 2021-12-12 NOTE — ED NOTES
Patient provided with discharge instructions. Education complete, all questions answered.   Lines removed, vitals stable. All personal belongings accounted for.   Patient ambulated out of the ER with family.

## 2021-12-12 NOTE — DISCHARGE INSTRUCTIONS
I want you to call Dr. Glasgow's office in the morning and set up an appointment for Holter monitor.  This record your heart rate and Bellatal when you are having palpitations or racing heartbeats ability to identify what the rhythm is in your heart.  If you have worsening symptoms, chest pain, shortness of breath or feeling your fainting, please return to the ED for further evaluations.  I want to take the metoprolol as directed to help with your symptoms until you see the cardiologist.

## 2022-01-19 ENCOUNTER — HOSPITAL ENCOUNTER (OUTPATIENT)
Facility: MEDICAL CENTER | Age: 37
End: 2022-01-19
Attending: PHYSICIAN ASSISTANT
Payer: COMMERCIAL

## 2022-01-19 ENCOUNTER — OFFICE VISIT (OUTPATIENT)
Dept: URGENT CARE | Facility: PHYSICIAN GROUP | Age: 37
End: 2022-01-19
Payer: COMMERCIAL

## 2022-01-19 VITALS
TEMPERATURE: 98 F | BODY MASS INDEX: 25.58 KG/M2 | RESPIRATION RATE: 16 BRPM | HEIGHT: 67 IN | WEIGHT: 163 LBS | SYSTOLIC BLOOD PRESSURE: 104 MMHG | DIASTOLIC BLOOD PRESSURE: 66 MMHG | OXYGEN SATURATION: 95 % | HEART RATE: 85 BPM

## 2022-01-19 DIAGNOSIS — R50.9 FEVER, UNSPECIFIED FEVER CAUSE: ICD-10-CM

## 2022-01-19 DIAGNOSIS — R05.9 COUGH: ICD-10-CM

## 2022-01-19 DIAGNOSIS — R52 BODY ACHES: ICD-10-CM

## 2022-01-19 DIAGNOSIS — J02.9 SORE THROAT: ICD-10-CM

## 2022-01-19 DIAGNOSIS — Z20.822 SUSPECTED COVID-19 VIRUS INFECTION: Primary | ICD-10-CM

## 2022-01-19 LAB
FLUAV+FLUBV AG SPEC QL IA: NEGATIVE
INT CON NEG: NEGATIVE
INT CON NEG: NEGATIVE
INT CON POS: POSITIVE
INT CON POS: POSITIVE
S PYO AG THROAT QL: NEGATIVE

## 2022-01-19 PROCEDURE — U0005 INFEC AGEN DETEC AMPLI PROBE: HCPCS

## 2022-01-19 PROCEDURE — 87804 INFLUENZA ASSAY W/OPTIC: CPT | Performed by: PHYSICIAN ASSISTANT

## 2022-01-19 PROCEDURE — 99214 OFFICE O/P EST MOD 30 MIN: CPT | Mod: CS | Performed by: PHYSICIAN ASSISTANT

## 2022-01-19 PROCEDURE — U0003 INFECTIOUS AGENT DETECTION BY NUCLEIC ACID (DNA OR RNA); SEVERE ACUTE RESPIRATORY SYNDROME CORONAVIRUS 2 (SARS-COV-2) (CORONAVIRUS DISEASE [COVID-19]), AMPLIFIED PROBE TECHNIQUE, MAKING USE OF HIGH THROUGHPUT TECHNOLOGIES AS DESCRIBED BY CMS-2020-01-R: HCPCS

## 2022-01-19 PROCEDURE — 87880 STREP A ASSAY W/OPTIC: CPT | Performed by: PHYSICIAN ASSISTANT

## 2022-01-19 RX ORDER — METOPROLOL SUCCINATE 25 MG/1
25 TABLET, EXTENDED RELEASE ORAL DAILY
COMMUNITY
End: 2022-02-03 | Stop reason: SDUPTHER

## 2022-01-19 ASSESSMENT — ENCOUNTER SYMPTOMS
HEADACHES: 1
MYALGIAS: 1
CHILLS: 1
EYE DISCHARGE: 0
COUGH: 1
NAUSEA: 0
SORE THROAT: 1
DIARRHEA: 1
SHORTNESS OF BREATH: 0
SPUTUM PRODUCTION: 0
FEVER: 1
VOMITING: 0

## 2022-01-19 ASSESSMENT — FIBROSIS 4 INDEX: FIB4 SCORE: 0.81

## 2022-01-19 NOTE — PROGRESS NOTES
Subjective     Chely Pizarro is a 36 y.o. female who presents with Sore Throat (cough, body aches, fever)    Medications:    • metoprolol SR Tb24  • sertraline    Allergies: Pcn [penicillins]    Problem List: Chely Pizarro does not have any pertinent problems on file.    Surgical History:  Past Surgical History:   Procedure Laterality Date   • GYN SURGERY      labiaplasty       Past Social Hx: Chely Pizarro  reports that she has never smoked. She has never used smokeless tobacco. She reports previous alcohol use. She reports that she does not use drugs.     Past Family Hx:  Chely Pizarro family history is not on file.     Problem list, medications, and allergies reviewed by myself today in Epic.            Patient presents with:  Sore Throat: cough, body aches, fever x 3 days. Pt has taken some over-the-counter Tylenol for symptoms with little relief.  Patient has not been vaccinated for COVID-19.  Patient is here with 2 of her children who are also having similar symptoms.        URI   This is a new problem. The current episode started in the past 7 days. The problem has been gradually worsening. The maximum temperature recorded prior to her arrival was 101 - 101.9 F. Associated symptoms include coughing, diarrhea, headaches and a sore throat. Pertinent negatives include no chest pain, nausea or vomiting. She has tried acetaminophen for the symptoms. The treatment provided mild relief.       Review of Systems   Constitutional: Positive for chills and fever.   HENT: Positive for sore throat.    Eyes: Negative for discharge.   Respiratory: Positive for cough. Negative for sputum production and shortness of breath.    Cardiovascular: Negative for chest pain.   Gastrointestinal: Positive for diarrhea. Negative for nausea and vomiting.   Musculoskeletal: Positive for myalgias.   Neurological: Positive for headaches.   All other systems reviewed and are negative.             Objective     BP  "104/66   Pulse 85   Temp 36.7 °C (98 °F)   Resp 16   Ht 1.702 m (5' 7\")   Wt 73.9 kg (163 lb)   SpO2 95%   BMI 25.53 kg/m²      Physical Exam  Vitals and nursing note reviewed.   Constitutional:       General: She is not in acute distress.     Appearance: Normal appearance. She is well-developed and normal weight. She is not toxic-appearing or diaphoretic.   HENT:      Head: Normocephalic and atraumatic.      Right Ear: Tympanic membrane normal.      Left Ear: Tympanic membrane normal.      Nose: Mucosal edema, congestion and rhinorrhea present.      Mouth/Throat:      Mouth: Mucous membranes are moist.      Pharynx: Uvula midline. Posterior oropharyngeal erythema present.      Tonsils: No tonsillar exudate.   Eyes:      General: Lids are normal.      Extraocular Movements: Extraocular movements intact.      Conjunctiva/sclera:      Right eye: Right conjunctiva is injected.      Left eye: Left conjunctiva is injected.      Pupils: Pupils are equal, round, and reactive to light.   Cardiovascular:      Rate and Rhythm: Normal rate and regular rhythm.      Pulses: Normal pulses.      Heart sounds: Normal heart sounds.   Pulmonary:      Effort: Pulmonary effort is normal. No respiratory distress.      Breath sounds: Normal breath sounds. No stridor. No wheezing, rhonchi or rales.   Abdominal:      Palpations: Abdomen is soft.   Musculoskeletal:         General: Normal range of motion.      Cervical back: Normal range of motion and neck supple.   Skin:     General: Skin is warm and dry.      Capillary Refill: Capillary refill takes less than 2 seconds.   Neurological:      General: No focal deficit present.      Mental Status: She is alert and oriented to person, place, and time.      Gait: Gait normal.   Psychiatric:         Mood and Affect: Mood normal.         Behavior: Behavior is cooperative.                           Lab Results/POC Test Results   Results for orders placed or performed in visit on 01/19/22 "   POCT Rapid Strep A   Result Value Ref Range    Rapid Strep Screen negative     Internal Control Positive Positive     Internal Control Negative Negative    POCT Influenza A/B   Result Value Ref Range    Rapid Influenza A-B negative     Internal Control Positive Positive     Internal Control Negative Negative              Assessment & Plan           1. Suspected COVID-19 virus infection  POCT Rapid Strep A    POCT Influenza A/B    COVID/SARS CoV-2 PCR   2. Sore throat  POCT Rapid Strep A    POCT Influenza A/B    COVID/SARS CoV-2 PCR   3. Cough  POCT Rapid Strep A    POCT Influenza A/B    COVID/SARS CoV-2 PCR   4. Body aches  POCT Rapid Strep A    POCT Influenza A/B    COVID/SARS CoV-2 PCR   5. Fever, unspecified fever cause  POCT Rapid Strep A    POCT Influenza A/B    COVID/SARS CoV-2 PCR       Per protocol for PUI/ISO patients, the patient was evaluated by me while I was wearing PPE.  Per CDC guidelines, patient has been instructed to self quarantine at home until test results are known.  IF positive, pt to remain at home for 10 days from onset of symptoms.  If negative, pt to remain at home until fever has resolved.       PT can begin or continue OTC medications, increase fluids and rest until symptoms improve.     PT should follow up with PCP in 1-2 days for re-evaluation if symptoms have not improved.      Discussed red flags and reasons to return to UC or ED.      Pt and/or family verbalized understanding of diagnosis and follow up instructions and was offered informational handout on diagnosis.  PT discharged.     I have spent at least 30 minutes on the care of this patient.  This includes preparing for visit which includes review of previous visits if available in EMR, obtaining HPI, exam and evaluation of patient, ordering and independent interpretation of labs, imaging, tests, medical management, counseling, education and documentation.

## 2022-01-20 DIAGNOSIS — Z20.822 SUSPECTED COVID-19 VIRUS INFECTION: ICD-10-CM

## 2022-01-20 DIAGNOSIS — R05.9 COUGH: ICD-10-CM

## 2022-01-20 DIAGNOSIS — R52 BODY ACHES: ICD-10-CM

## 2022-01-20 DIAGNOSIS — R50.9 FEVER, UNSPECIFIED FEVER CAUSE: ICD-10-CM

## 2022-01-20 DIAGNOSIS — J02.9 SORE THROAT: ICD-10-CM

## 2022-01-20 LAB — COVID ORDER STATUS COVID19: NORMAL

## 2022-01-21 LAB
SARS-COV-2 RNA RESP QL NAA+PROBE: DETECTED
SPECIMEN SOURCE: ABNORMAL

## 2022-02-03 ENCOUNTER — PATIENT MESSAGE (OUTPATIENT)
Dept: MEDICAL GROUP | Facility: PHYSICIAN GROUP | Age: 37
End: 2022-02-03

## 2022-02-03 RX ORDER — METOPROLOL SUCCINATE 25 MG/1
25 TABLET, EXTENDED RELEASE ORAL DAILY
Qty: 30 TABLET | Refills: 0 | Status: SHIPPED
Start: 2022-02-03 | End: 2022-02-28

## 2022-02-28 ENCOUNTER — OFFICE VISIT (OUTPATIENT)
Dept: MEDICAL GROUP | Facility: PHYSICIAN GROUP | Age: 37
End: 2022-02-28
Payer: COMMERCIAL

## 2022-02-28 VITALS
HEIGHT: 67 IN | DIASTOLIC BLOOD PRESSURE: 68 MMHG | OXYGEN SATURATION: 97 % | TEMPERATURE: 98.9 F | BODY MASS INDEX: 26.06 KG/M2 | RESPIRATION RATE: 14 BRPM | WEIGHT: 166 LBS | SYSTOLIC BLOOD PRESSURE: 108 MMHG | HEART RATE: 84 BPM

## 2022-02-28 DIAGNOSIS — F41.9 ANXIETY: ICD-10-CM

## 2022-02-28 PROBLEM — Z76.89 ENCOUNTER TO ESTABLISH CARE: Status: RESOLVED | Noted: 2021-12-09 | Resolved: 2022-02-28

## 2022-02-28 PROCEDURE — 99214 OFFICE O/P EST MOD 30 MIN: CPT | Performed by: FAMILY MEDICINE

## 2022-02-28 RX ORDER — SERTRALINE HYDROCHLORIDE 100 MG/1
100 TABLET, FILM COATED ORAL DAILY
Qty: 90 TABLET | Refills: 3 | Status: SHIPPED | OUTPATIENT
Start: 2022-02-28 | End: 2022-10-20

## 2022-02-28 RX ORDER — BUSPIRONE HYDROCHLORIDE 5 MG/1
TABLET ORAL
Qty: 60 TABLET | Refills: 5 | Status: SHIPPED | OUTPATIENT
Start: 2022-02-28 | End: 2022-10-20

## 2022-02-28 ASSESSMENT — FIBROSIS 4 INDEX: FIB4 SCORE: 0.81

## 2022-02-28 ASSESSMENT — PATIENT HEALTH QUESTIONNAIRE - PHQ9: CLINICAL INTERPRETATION OF PHQ2 SCORE: 0

## 2022-03-01 NOTE — PROGRESS NOTES
"Subjective:     CC: Here for follow-up on her anxiety.    HPI:   Chely presents today with the following medical concerns:    Anxiety  This is a chronic problem.  Patient's had about 10 episodes of anxiety since I saw her in December.  One particular time was when she was driving and she had to pull over and have her father-in-law come pick her up.  She did increase to 50 mg of Zoloft.  She is now stopped breast-feeding so we can safely go higher on the dose.  She is also asking if she can have something to use intermittently during the particularly bad days.  She has not noticed any triggers.  She also has got on vitamins and started an exercise program.  She also saw the cardiologist for palpitations and has a Holter monitor on.      History reviewed. No pertinent past medical history.    Social History     Tobacco Use   • Smoking status: Never Smoker   • Smokeless tobacco: Never Used   Vaping Use   • Vaping Use: Never used   Substance Use Topics   • Alcohol use: Not Currently   • Drug use: No       Current Outpatient Medications Ordered in Epic   Medication Sig Dispense Refill   • sertraline (ZOLOFT) 100 MG Tab Take 1 Tablet by mouth every day. 90 Tablet 3   • busPIRone (BUSPAR) 5 MG tablet Take 1-2 po BID as directed for anxiety 60 Tablet 5     No current Epic-ordered facility-administered medications on file.       Allergies:  Pcn [penicillins]    Health Maintenance: Completed    ROS:  Gen: no fevers/chills, no changes in weight  Eyes: no changes in vision  ENT: no sore throat, no hearing loss, no bloody nose  Pulm: no sob, no cough  CV: no chest pain,   GI: no nausea/vomiting, no diarrhea  : no dysuria  MSk: no myalgias  Skin: no rash  Neuro: no headaches, no numbness/tingling  Heme/Lymph: no easy bruising      Objective:       Exam:  /68 (BP Location: Right arm, Patient Position: Sitting, BP Cuff Size: Adult)   Pulse 84   Temp 37.2 °C (98.9 °F) (Temporal)   Resp 14   Ht 1.702 m (5' 7\")   Wt 75.3 " kg (166 lb)   SpO2 97%   Breastfeeding No   BMI 26.00 kg/m²  Body mass index is 26 kg/m².    Gen: Alert and oriented, No apparent distress.  Psych:   Patient is alert and oriented x3.  No unusual thought presence expressed.  Insight and judgment is good.  Does not appear to be overtly anxious or depressed on today's visit.      Assessment & Plan:     36 y.o. female with the following -     1. Anxiety  This is a chronic problem.  Patient is continue to see her counselor which is beneficial.  I think that her exercise program and diet are a good idea.  She is to continue with that.  We will also increase the Zoloft 200 mg a day.  I did give her a prescription for buspirone to use as needed for the day she has anxiety.  She wanted to try the very lowest dose possible.  She is to take it on a day when she feels fine to make sure it does not cause excessive sedation.  She is to give me a MyChart progress report in 1 to 2 weeks.      Return if symptoms worsen or fail to improve.    Please note that this dictation was created using voice recognition software. I have made every reasonable attempt to correct obvious errors, but I expect that there are errors of grammar and possibly content that I did not discover before finalizing the note.

## 2022-03-01 NOTE — ASSESSMENT & PLAN NOTE
This is a chronic problem.  Patient's had about 10 episodes of anxiety since I saw her in December.  One particular time was when she was driving and she had to pull over and have her father-in-law come pick her up.  She did increase to 50 mg of Zoloft.  She is now stopped breast-feeding so we can safely go higher on the dose.  She is also asking if she can have something to use intermittently during the particularly bad days.  She has not noticed any triggers.  She also has got on vitamins and started an exercise program.  She also saw the cardiologist for palpitations and has a Holter monitor on.

## 2022-05-10 ENCOUNTER — HOSPITAL ENCOUNTER (OUTPATIENT)
Dept: LAB | Facility: MEDICAL CENTER | Age: 37
End: 2022-05-10
Attending: FAMILY MEDICINE
Payer: COMMERCIAL

## 2022-05-10 ENCOUNTER — OFFICE VISIT (OUTPATIENT)
Dept: MEDICAL GROUP | Facility: PHYSICIAN GROUP | Age: 37
End: 2022-05-10
Payer: COMMERCIAL

## 2022-05-10 VITALS
SYSTOLIC BLOOD PRESSURE: 110 MMHG | BODY MASS INDEX: 25.62 KG/M2 | OXYGEN SATURATION: 99 % | DIASTOLIC BLOOD PRESSURE: 70 MMHG | WEIGHT: 163.2 LBS | HEIGHT: 67 IN | TEMPERATURE: 98 F | RESPIRATION RATE: 18 BRPM | HEART RATE: 76 BPM

## 2022-05-10 DIAGNOSIS — M54.50 CHRONIC BILATERAL LOW BACK PAIN WITHOUT SCIATICA: ICD-10-CM

## 2022-05-10 DIAGNOSIS — G89.29 CHRONIC BILATERAL LOW BACK PAIN WITHOUT SCIATICA: ICD-10-CM

## 2022-05-10 DIAGNOSIS — F41.9 ANXIETY: ICD-10-CM

## 2022-05-10 DIAGNOSIS — K59.1 FUNCTIONAL DIARRHEA: ICD-10-CM

## 2022-05-10 PROCEDURE — 80053 COMPREHEN METABOLIC PANEL: CPT

## 2022-05-10 PROCEDURE — 36415 COLL VENOUS BLD VENIPUNCTURE: CPT

## 2022-05-10 PROCEDURE — 99213 OFFICE O/P EST LOW 20 MIN: CPT | Performed by: FAMILY MEDICINE

## 2022-05-10 ASSESSMENT — FIBROSIS 4 INDEX: FIB4 SCORE: 0.81

## 2022-05-10 NOTE — PROGRESS NOTES
Subjective:     CC: Here for several issues.    HPI:   Chely presents today with the following medical concerns:    Chronic bilateral low back pain without sciatica  This is a chronic problem.  Patient been having troubles with low back pain since she had COVID in January.  States the pain is constantly there.  It is worse when she leans forward or backwards.  No known injury.  No previous history of chronic back pain.  Does not have any sciatica.  She has not taken anything for it.    Anxiety  This is a chronic problem.  Patient states she is much improved since last seen.  She knows her triggers now and does her best to avoid them.  She is not really using the antianxiety medication.    Functional diarrhea  This is a chronic problem.  Patient states that she had COVID in January she has been getting intermittent episodes of sudden onset of watery diarrhea.  She started trying different probiotics and when she drinks Kombuche  her symptoms are much improved.      Past Medical History:   Diagnosis Date   • Chronic bilateral low back pain without sciatica 5/10/2022       Social History     Tobacco Use   • Smoking status: Never Smoker   • Smokeless tobacco: Never Used   Vaping Use   • Vaping Use: Never used   Substance Use Topics   • Alcohol use: Not Currently   • Drug use: No       Current Outpatient Medications Ordered in Epic   Medication Sig Dispense Refill   • sertraline (ZOLOFT) 100 MG Tab Take 1 Tablet by mouth every day. 90 Tablet 3   • busPIRone (BUSPAR) 5 MG tablet Take 1-2 po BID as directed for anxiety 60 Tablet 5     No current Epic-ordered facility-administered medications on file.       Allergies:  Pcn [penicillins]    Health Maintenance: Completed    ROS:  Gen: no fevers/chills, no changes in weight  ENT: no sore throat, no hearing loss, no bloody nose  Pulm: no sob, no cough  CV: no chest pain, no palpitations  GI: no nausea/vomiting,   : no dysuria  Skin: no rash  Neuro: no headaches, no  "numbness/tingling  Heme/Lymph: no easy bruising      Objective:       Exam:  /70 (BP Location: Right arm, Patient Position: Sitting, BP Cuff Size: Adult)   Pulse 76   Temp 36.7 °C (98 °F) (Temporal)   Resp 18   Ht 1.702 m (5' 7\")   Wt 74 kg (163 lb 3.2 oz)   SpO2 99%   Breastfeeding Yes Comment: sometimes  BMI 25.56 kg/m²  Body mass index is 25.56 kg/m².    Gen: Alert and oriented, No apparent distress.  Ext: No clubbing, cyanosis, edema.  Back:   Patient has mild tenderness along palpation of the sacroiliac joints.  No muscle spasm is felt.  She has increase in low back pain with extension or full flexion.  Also with twisting motion to the left.  No increase in pain with lateral movement or right rotation.  No sciatica noted.  Gait is normal.    Assessment & Plan:     36 y.o. female with the following -     1. Chronic bilateral low back pain without sciatica  This is a chronic problem.  We will get some baseline labs and an x-ray.  She was told this may be all musculoskeletal or there is a remote possibility could be due to long-haul COVID.  She was told apply heat to the painful area.  She just recently started doing core exercises and she was encouraged to continue to do those.  She also can take over-the-counter NSAIDs but she is reluctant to take medications.  If all this is not helpful we could always refer her for physical therapy or massage therapy.  - Comp Metabolic Panel; Future  - DX-LUMBAR SPINE-2 OR 3 VIEWS; Future    2. Anxiety  This is a chronic problem.  Appears improved.  Continue to follow.    3. Functional diarrhea  This is a chronic problem.  She was told to try to keep a symptom and food diary to see if any that is related.  As long she is doing better with the drink she is using she can continue on that.  Also increase fiber in the diet.      Return if symptoms worsen or fail to improve.    Please note that this dictation was created using voice recognition software. I have made " every reasonable attempt to correct obvious errors, but I expect that there are errors of grammar and possibly content that I did not discover before finalizing the note.

## 2022-05-10 NOTE — ASSESSMENT & PLAN NOTE
This is a chronic problem.  Patient states she is much improved since last seen.  She knows her triggers now and does her best to avoid them.  She is not really using the antianxiety medication.

## 2022-05-10 NOTE — ASSESSMENT & PLAN NOTE
This is a chronic problem.  Patient been having troubles with low back pain since she had COVID in January.  States the pain is constantly there.  It is worse when she leans forward or backwards.  No known injury.  No previous history of chronic back pain.  Does not have any sciatica.  She has not taken anything for it.

## 2022-05-11 LAB
ALBUMIN SERPL BCP-MCNC: 5 G/DL (ref 3.2–4.9)
ALBUMIN/GLOB SERPL: 2.1 G/DL
ALP SERPL-CCNC: 77 U/L (ref 30–99)
ALT SERPL-CCNC: 23 U/L (ref 2–50)
ANION GAP SERPL CALC-SCNC: 15 MMOL/L (ref 7–16)
AST SERPL-CCNC: 18 U/L (ref 12–45)
BILIRUB SERPL-MCNC: 0.4 MG/DL (ref 0.1–1.5)
BUN SERPL-MCNC: 19 MG/DL (ref 8–22)
CALCIUM SERPL-MCNC: 9.9 MG/DL (ref 8.5–10.5)
CHLORIDE SERPL-SCNC: 103 MMOL/L (ref 96–112)
CO2 SERPL-SCNC: 21 MMOL/L (ref 20–33)
CREAT SERPL-MCNC: 0.74 MG/DL (ref 0.5–1.4)
GFR SERPLBLD CREATININE-BSD FMLA CKD-EPI: 106 ML/MIN/1.73 M 2
GLOBULIN SER CALC-MCNC: 2.4 G/DL (ref 1.9–3.5)
GLUCOSE SERPL-MCNC: 65 MG/DL (ref 65–99)
POTASSIUM SERPL-SCNC: 4.4 MMOL/L (ref 3.6–5.5)
PROT SERPL-MCNC: 7.4 G/DL (ref 6–8.2)
SODIUM SERPL-SCNC: 139 MMOL/L (ref 135–145)

## 2022-06-10 ENCOUNTER — OFFICE VISIT (OUTPATIENT)
Dept: URGENT CARE | Facility: PHYSICIAN GROUP | Age: 37
End: 2022-06-10
Payer: COMMERCIAL

## 2022-06-10 VITALS
DIASTOLIC BLOOD PRESSURE: 72 MMHG | BODY MASS INDEX: 25.11 KG/M2 | HEIGHT: 67 IN | WEIGHT: 160 LBS | OXYGEN SATURATION: 98 % | RESPIRATION RATE: 16 BRPM | SYSTOLIC BLOOD PRESSURE: 122 MMHG | TEMPERATURE: 97.9 F | HEART RATE: 91 BPM

## 2022-06-10 DIAGNOSIS — J31.0 PURULENT RHINITIS: ICD-10-CM

## 2022-06-10 DIAGNOSIS — J32.9 RHINOSINUSITIS: ICD-10-CM

## 2022-06-10 PROCEDURE — 99213 OFFICE O/P EST LOW 20 MIN: CPT | Performed by: NURSE PRACTITIONER

## 2022-06-10 RX ORDER — DOXYCYCLINE HYCLATE 100 MG
100 TABLET ORAL EVERY 12 HOURS
Qty: 14 TABLET | Refills: 0 | Status: SHIPPED | OUTPATIENT
Start: 2022-06-10 | End: 2022-06-17

## 2022-06-10 ASSESSMENT — FIBROSIS 4 INDEX: FIB4 SCORE: 0.61

## 2022-06-10 ASSESSMENT — ENCOUNTER SYMPTOMS
SINUS PAIN: 1
SINUS PRESSURE: 1

## 2022-06-10 ASSESSMENT — VISUAL ACUITY: OU: 1

## 2022-06-10 NOTE — PATIENT INSTRUCTIONS
Suggest using any combination of the following over-the-counter medications per 's instructions:  - sinus rinse kits, neti pot, nasal saline sprays  - nasal steroid sprays (e.g. fluticasone/Flonase, Nasacort)  - oral daily antihistamine (e.g. loratadine/Claritin, Zyrtec, Allegra)  - oral decongestant (e.g. pseudoephedrine, Sudafed)  - oral pain reliever/fever reducer (e.g. acetaminophen, Tylenol)  - oral anti-inflammatory/pain reliever/fever reducer (NSAID) (e.g. ibuprofen, Motrin, Advil)

## 2022-06-10 NOTE — PROGRESS NOTES
Subjective:     Chely Pizarro is a 36 y.o. female who presents for Sinus Problem (X6day nose is draining green and yellow mucus. Sinus facial pressure, ear pressure and popping, back of gum pressure.)       Sinus Problem  This is a new problem. The problem has been gradually worsening since onset. Associated symptoms include congestion, ear pain and sinus pressure.     Whole family had a cold about 1 week ago.  Symptoms started to worsen.  Child recently seen and treated with antibiotics for sinus infection.  Patient starting to experience similar worsening symptoms.    Patient was screened prior to rooming and denied COVID-19 diagnosis or contact with a person who has been diagnosed or is suspected to have COVID-19. During this visit, appropriate PPE was worn, hand hygiene was performed, and the patient and any visitors were masked.     PMH:  has a past medical history of Chronic bilateral low back pain without sciatica (5/10/2022).    She has no past medical history of Anginal syndrome (HCC), Arrhythmia, Arthritis, Asthma, At risk for sleep apnea, Blood clotting disorder ([facility]), Bronchitis, Cancer ([facility]), Cataract, Congestive heart failure ([facility]), COPD (chronic obstructive pulmonary disease) ([facility]), Diabetes (HCC), Dialysis patient ([facility]), Disorder of thyroid, Fall, Glaucoma, Gynecological disorder, Heart murmur, Heart valve disease, Hemorrhagic disorder ([facility]), Hypertension, Indigestion, Infectious disease, Jaundice, Myocardial infarct ([facility]), Pacemaker, Pneumonia, Psychiatric problem, Renal disorder, Rheumatic fever, Seizure ([facility]), Stroke ([facility]), or Urinary incontinence.    MEDS:   Current Outpatient Medications:   •  doxycycline (VIBRAMYCIN) 100 MG Tab, Take 1 Tablet by mouth every 12 hours for 7 days., Disp: 14 Tablet, Rfl: 0  •  sertraline (ZOLOFT) 100 MG Tab, Take 1 Tablet by mouth every day., Disp: 90 Tablet, Rfl: 3  •  busPIRone (BUSPAR) 5 MG tablet, Take 1-2 po BID as directed for anxiety, Disp: 60 Tablet,  "Rfl: 5    ALLERGIES:   Allergies   Allergen Reactions   • Pcn [Penicillins] Rash     RASH     SURGHX:   Past Surgical History:   Procedure Laterality Date   • GYN SURGERY      labiaplasty     SOCHX:  reports that she has never smoked. She has never used smokeless tobacco. She reports previous alcohol use. She reports that she does not use drugs.   FH: Reviewed with patient, not pertinent to this visit.    Review of Systems   Constitutional: Positive for malaise/fatigue.   HENT: Positive for congestion, ear pain, sinus pressure and sinus pain.    All other systems reviewed and are negative.    Additional details per HPI.      Objective:     /72   Pulse 91   Temp 36.6 °C (97.9 °F) (Tympanic)   Resp 16   Ht 1.702 m (5' 7\")   Wt 72.6 kg (160 lb)   SpO2 98%   BMI 25.06 kg/m²     Physical Exam  Vitals reviewed.   Constitutional:       General: She is not in acute distress.     Appearance: She is well-developed. She is not ill-appearing or toxic-appearing.   HENT:      Right Ear: Tympanic membrane normal.      Left Ear: Tympanic membrane normal.      Nose: Congestion and rhinorrhea present.      Right Sinus: Maxillary sinus tenderness and frontal sinus tenderness present.      Left Sinus: Maxillary sinus tenderness and frontal sinus tenderness present.      Mouth/Throat:      Mouth: Mucous membranes are moist.      Pharynx: Oropharynx is clear.   Eyes:      General: Vision grossly intact.      Extraocular Movements: Extraocular movements intact.   Cardiovascular:      Rate and Rhythm: Normal rate.   Pulmonary:      Effort: Pulmonary effort is normal. No respiratory distress.   Musculoskeletal:         General: No deformity. Normal range of motion.      Cervical back: Normal range of motion.   Skin:     General: Skin is warm and dry.      Coloration: Skin is not pale.   Neurological:      Mental Status: She is alert and oriented to person, place, and time.      Sensory: No sensory deficit.      Motor: No " weakness.   Psychiatric:         Behavior: Behavior normal. Behavior is cooperative.       Assessment/Plan:     1. Rhinosinusitis  - doxycycline (VIBRAMYCIN) 100 MG Tab; Take 1 Tablet by mouth every 12 hours for 7 days.  Dispense: 14 Tablet; Refill: 0    2. Purulent rhinitis  - doxycycline (VIBRAMYCIN) 100 MG Tab; Take 1 Tablet by mouth every 12 hours for 7 days.  Dispense: 14 Tablet; Refill: 0    Rx as above sent electronically.  Allergic to penicillins.  Sent for doxycycline.  Patient will stop breast-feeding at this time.    Suggest using any combination of the following over-the-counter medications per 's instructions:  - sinus rinse kits, neti pot, nasal saline sprays  - nasal steroid sprays (e.g. fluticasone/Flonase, Nasacort)  - oral daily antihistamine (e.g. loratadine/Claritin, Zyrtec, Allegra)  - oral decongestant (e.g. pseudoephedrine, Sudafed)  - oral pain reliever/fever reducer (e.g. acetaminophen, Tylenol)  - oral anti-inflammatory/pain reliever/fever reducer (NSAID) (e.g. ibuprofen, Motrin, Advil)    Differential diagnosis, natural history, supportive care, over-the-counter symptom management per 's instructions, close monitoring, and indications for immediate follow-up discussed.     All questions answered. Patient agrees with the plan of care.    Discharge summary provided via [employer].

## 2022-10-20 ENCOUNTER — TELEMEDICINE (OUTPATIENT)
Dept: MEDICAL GROUP | Facility: PHYSICIAN GROUP | Age: 37
End: 2022-10-20
Payer: COMMERCIAL

## 2022-10-20 VITALS — BODY MASS INDEX: 25.94 KG/M2 | HEIGHT: 67 IN | WEIGHT: 165.3 LBS | RESPIRATION RATE: 18 BRPM

## 2022-10-20 DIAGNOSIS — F41.9 ANXIETY: ICD-10-CM

## 2022-10-20 DIAGNOSIS — J30.2 SEASONAL ALLERGIES: ICD-10-CM

## 2022-10-20 DIAGNOSIS — J45.21 MILD INTERMITTENT ASTHMA WITH ACUTE EXACERBATION: ICD-10-CM

## 2022-10-20 PROCEDURE — 99213 OFFICE O/P EST LOW 20 MIN: CPT | Mod: 95 | Performed by: FAMILY MEDICINE

## 2022-10-20 RX ORDER — ALBUTEROL SULFATE 90 UG/1
2 AEROSOL, METERED RESPIRATORY (INHALATION) EVERY 6 HOURS PRN
Qty: 1 EACH | Refills: 3 | Status: SHIPPED | OUTPATIENT
Start: 2022-10-20 | End: 2023-08-02

## 2022-10-20 ASSESSMENT — FIBROSIS 4 INDEX: FIB4 SCORE: 0.61

## 2022-10-20 NOTE — ASSESSMENT & PLAN NOTE
This is a new problem.  Patient states recently with her allergies flaring she has been having some troubles with a little bit of tightness and wheezing in the evening.  She had an old inhaler and that helped immediately.  She is asking for refill.  She states as a child she did have asthma.

## 2022-10-20 NOTE — ASSESSMENT & PLAN NOTE
This is a new problem.  Patient states for last couple weeks has been having troubles with nasal congestion and clear drainage.  She also is having frequent sneezing.  It is better taking Claritin.  She states she had allergies when she was younger but has not had any here as an adult until recently.

## 2022-10-20 NOTE — PROGRESS NOTES
Virtual Visit: Established Patient   This visit was conducted via Zoom using secure and encrypted videoconferencing technology.   The patient was in their home in the state [facility].    The patient's identity was confirmed and verbal consent was obtained for this virtual visit.     Subjective:   CC:   Chief Complaint   Patient presents with    Seasonal Allergies     X 4 weeks   Itchy eyes, throat  Sneezing, states she has noticed some wheezing.        Chely Pizarro is a 36 y.o. female presenting for evaluation and management of:    Seasonal allergies  This is a new problem.  Patient states for last couple weeks has been having troubles with nasal congestion and clear drainage.  She also is having frequent sneezing.  It is better taking Claritin.  She states she had allergies when she was younger but has not had any here as an adult until recently.    Mild intermittent asthma with acute exacerbation  This is a new problem.  Patient states recently with her allergies flaring she has been having some troubles with a little bit of tightness and wheezing in the evening.  She had an old inhaler and that helped immediately.  She is asking for refill.  She states as a child she did have asthma.    Anxiety  This is a resolved problem.  Patient states she was having difficulty getting her medications when her pharmacy closed.  She had been out of it for a week or 2 and was feeling fine so she just stayed off of them and is still doing well.      ROS   No fever or chills    Current medicines (including changes today)  Current Outpatient Medications   Medication Sig Dispense Refill    albuterol 108 (90 Base) MCG/ACT Aero Soln inhalation aerosol Inhale 2 Puffs every 6 hours as needed for Shortness of Breath. 1 Each 3     No current facility-administered medications for this visit.       Patient Active Problem List    Diagnosis Date Noted    Mild intermittent asthma with acute exacerbation 10/20/2022    Seasonal allergies  "10/20/2022    Chronic bilateral low back pain without sciatica 05/10/2022    Functional diarrhea 05/10/2022    Vertigo 12/09/2021    Anxiety 12/09/2021    Pre-syncope 11/10/2021    Fatigue 10/18/2017        Objective:   Resp 18   Ht 1.702 m (5' 7\")   Wt 75 kg (165 lb 4.8 oz) Comment: pt provided  BMI 25.89 kg/m²     Physical Exam:  Constitutional: Alert, no distress, well-groomed.  Skin: No rashes in visible areas.  Eye: Round. No icterus.   ENMT: Lips pink without lesions. Phonation normal.  Respiratory: Unlabored respiratory effort, no cough or audible wheeze  Psych: Alert and oriented x3, normal affect and mood.     Assessment and Plan:   The following treatment plan was discussed:     1. Mild intermittent asthma with acute exacerbation  This is a new problem.  Does sound like she had troubles as a child and it may returned with her onset of allergies.  We will give her a renewal on her albuterol inhaler.  If she has worsening troubles she needs to be seen in the clinic.  2. Seasonal allergies  This is a recurrence of a chronic problem.  Patient was told to use over-the-counter antihistamines as needed.  If it does not settle down in the next few weeks she can certainly be seen in the clinic.  3. Anxiety  This is a resolved problem.  Continue to follow for recurrence in the future.  Other orders  - albuterol 108 (90 Base) MCG/ACT Aero Soln inhalation aerosol; Inhale 2 Puffs every 6 hours as needed for Shortness of Breath.  Dispense: 1 Each; Refill: 3      Follow-up: No follow-ups on file.         "

## 2022-10-20 NOTE — ASSESSMENT & PLAN NOTE
This is a resolved problem.  Patient states she was having difficulty getting her medications when her pharmacy closed.  She had been out of it for a week or 2 and was feeling fine so she just stayed off of them and is still doing well.

## 2022-12-29 ENCOUNTER — HOSPITAL ENCOUNTER (OUTPATIENT)
Dept: RADIOLOGY | Facility: MEDICAL CENTER | Age: 37
End: 2022-12-29
Attending: FAMILY MEDICINE
Payer: COMMERCIAL

## 2022-12-29 ENCOUNTER — OFFICE VISIT (OUTPATIENT)
Dept: URGENT CARE | Facility: PHYSICIAN GROUP | Age: 37
End: 2022-12-29
Payer: COMMERCIAL

## 2022-12-29 VITALS
RESPIRATION RATE: 16 BRPM | SYSTOLIC BLOOD PRESSURE: 108 MMHG | OXYGEN SATURATION: 98 % | DIASTOLIC BLOOD PRESSURE: 64 MMHG | WEIGHT: 170 LBS | BODY MASS INDEX: 26.68 KG/M2 | TEMPERATURE: 97.9 F | HEART RATE: 72 BPM | HEIGHT: 67 IN

## 2022-12-29 DIAGNOSIS — M79.605 LEFT LEG PAIN: ICD-10-CM

## 2022-12-29 PROCEDURE — 93971 EXTREMITY STUDY: CPT | Mod: LT

## 2022-12-29 PROCEDURE — 99215 OFFICE O/P EST HI 40 MIN: CPT | Performed by: FAMILY MEDICINE

## 2022-12-29 RX ORDER — PANTOPRAZOLE SODIUM 40 MG/1
TABLET, DELAYED RELEASE ORAL
COMMUNITY
Start: 2022-10-26 | End: 2023-12-19 | Stop reason: SDUPTHER

## 2022-12-29 ASSESSMENT — FIBROSIS 4 INDEX: FIB4 SCORE: 0.62

## 2022-12-29 NOTE — PROGRESS NOTES
"Chief Complaint   Patient presents with    Muscle Pain     L calf possible blood clot    Difficulty Walking       HPI:      Pt c/o 3 d dull, achy left  leg pain      Pain worse with walking    No improvement with NSAID      Denies trauma/injury      She does not smoke    No OCPs or HRT.       Past Medical History:   Diagnosis Date    Chronic bilateral low back pain without sciatica 5/10/2022    Mild intermittent asthma with acute exacerbation 10/20/2022       Social History     Tobacco Use    Smoking status: Never    Smokeless tobacco: Never   Vaping Use    Vaping Use: Never used   Substance Use Topics    Alcohol use: Not Currently    Drug use: No         History reviewed. No pertinent family history.            Review of Systems   Constitutional: Negative for fever, chills and malaise/fatigue.   Eyes: Negative for vision changes, d/c.    Respiratory: Negative for cough and sputum production.    Cardiovascular: Negative for chest pain and palpitations.   Gastrointestinal: Negative for nausea, vomiting, abdominal pain, diarrhea and constipation.   Genitourinary: Negative for dysuria, urgency and frequency.   Skin: Negative for rash or  itching.   Neurological: Negative for dizziness and tingling.   Psychiatric/Behavioral: Negative for depression.   Hematologic/lymphatic - denies bruising or excessive bleeding  All other systems reviewed and are negative.      OBJECTIVE  /64 (BP Location: Left arm, Patient Position: Sitting, BP Cuff Size: Adult)   Pulse 72   Temp 36.6 °C (97.9 °F) (Temporal)   Resp 16   Ht 1.702 m (5' 7\")   Wt 77.1 kg (170 lb)   SpO2 98%     HEENT - PERRLA, EOMI  Neuro - alert and oriented x3. CN 2-12 grossly intact.  Lungs - CTA. No wheezes, rhonchi or rales.  Heart - regular rate and rhythm without murmur.  Abdomen - soft and non-tender, bowel sounds active x4.  Musculoskeletal -  Left  leg - there is some mild TTP over medial calf muscle.  No lower extremity edema noted.  Jared's sign " positive      Details    Reading Physician Reading Date Result Priority   Ricky Fairchild M.D.  [phone] 12/29/2022 Stat-Call Report     Narrative & Impression     12/29/2022 6:18 PM     HISTORY/REASON FOR EXAM:  Pain in Limb with Pressure  Left leg pain     TECHNIQUE/EXAM DESCRIPTION:  Using both color and pulsed-wave Doppler imaging, multiple images were obtained from the left common femoral vein origin distally through the popliteal trifurcation.  Graded compression was used to demonstrate a patent lumen.     COMPARISON:  None.     FINDINGS:  There is no evidence of luminal thrombus.  There is normal augmentation to flow and respiratory variation.     Focus ultrasound in the area of concern in the left mid calf demonstrates no abnormality. No mass, fluid collection or hematoma.     IMPRESSION:        1. No evidence of left lower extremity deep venous thrombosis.    A/P:    1. Left leg pain      U/s personally reviewed.   There is no DVT.     I called pt at 1945 and left VM      Pain is likely muscular in origin.     rx motrin 800mg tid prn        Follow up in one week if no improvement, sooner if symptoms worsen.         My total time spent caring for the patient on the day of the encounter was 40 minutes.   This does not include time spent on separately billable procedures/tests.

## 2022-12-30 ENCOUNTER — APPOINTMENT (OUTPATIENT)
Dept: MEDICAL GROUP | Facility: PHYSICIAN GROUP | Age: 37
End: 2022-12-30
Payer: COMMERCIAL

## 2023-01-27 ENCOUNTER — OFFICE VISIT (OUTPATIENT)
Dept: MEDICAL GROUP | Facility: PHYSICIAN GROUP | Age: 38
End: 2023-01-27
Payer: COMMERCIAL

## 2023-01-27 VITALS
BODY MASS INDEX: 26.21 KG/M2 | TEMPERATURE: 98.2 F | OXYGEN SATURATION: 100 % | DIASTOLIC BLOOD PRESSURE: 74 MMHG | RESPIRATION RATE: 16 BRPM | HEART RATE: 72 BPM | WEIGHT: 167 LBS | SYSTOLIC BLOOD PRESSURE: 116 MMHG | HEIGHT: 67 IN

## 2023-01-27 DIAGNOSIS — F41.9 ANXIETY: ICD-10-CM

## 2023-01-27 DIAGNOSIS — F40.243 ANXIETY WITH FLYING: ICD-10-CM

## 2023-01-27 PROCEDURE — 99214 OFFICE O/P EST MOD 30 MIN: CPT | Performed by: FAMILY MEDICINE

## 2023-01-27 RX ORDER — SERTRALINE HYDROCHLORIDE 100 MG/1
100 TABLET, FILM COATED ORAL DAILY
Qty: 90 TABLET | Refills: 3 | Status: SHIPPED | OUTPATIENT
Start: 2023-01-27 | End: 2023-08-02

## 2023-01-27 RX ORDER — ALPRAZOLAM 0.5 MG/1
0.5 TABLET ORAL 3 TIMES DAILY PRN
Qty: 10 TABLET | Refills: 0 | Status: SHIPPED | OUTPATIENT
Start: 2023-01-27 | End: 2023-02-26

## 2023-01-27 RX ORDER — HYDROXYZINE HYDROCHLORIDE 25 MG/1
25 TABLET, FILM COATED ORAL 3 TIMES DAILY PRN
Qty: 30 TABLET | Refills: 1 | Status: SHIPPED | OUTPATIENT
Start: 2023-01-27 | End: 2024-02-13

## 2023-01-27 ASSESSMENT — FIBROSIS 4 INDEX: FIB4 SCORE: 0.62

## 2023-01-27 ASSESSMENT — ANXIETY QUESTIONNAIRES
IF YOU CHECKED OFF ANY PROBLEMS ON THIS QUESTIONNAIRE, HOW DIFFICULT HAVE THESE PROBLEMS MADE IT FOR YOU TO DO YOUR WORK, TAKE CARE OF THINGS AT HOME, OR GET ALONG WITH OTHER PEOPLE: SOMEWHAT DIFFICULT
7. FEELING AFRAID AS IF SOMETHING AWFUL MIGHT HAPPEN: MORE THAN HALF THE DAYS
GAD7 TOTAL SCORE: 8
6. BECOMING EASILY ANNOYED OR IRRITABLE: MORE THAN HALF THE DAYS
4. TROUBLE RELAXING: SEVERAL DAYS
5. BEING SO RESTLESS THAT IT IS HARD TO SIT STILL: NOT AT ALL
1. FEELING NERVOUS, ANXIOUS, OR ON EDGE: SEVERAL DAYS
2. NOT BEING ABLE TO STOP OR CONTROL WORRYING: SEVERAL DAYS
3. WORRYING TOO MUCH ABOUT DIFFERENT THINGS: SEVERAL DAYS

## 2023-01-27 ASSESSMENT — PATIENT HEALTH QUESTIONNAIRE - PHQ9: CLINICAL INTERPRETATION OF PHQ2 SCORE: 0

## 2023-01-27 NOTE — ASSESSMENT & PLAN NOTE
This is a chronic problem.  Patient's always had troubles with flying.  She does have a short trip next month to [place] that she thinks will not  be too bad but she has a upcoming trip to [place] which would be an extended flight and she is asking for some Xanax for that.  That has worked for her in the past.

## 2023-01-27 NOTE — ASSESSMENT & PLAN NOTE
This is a chronic problem.  Patient has been off of her sertraline for a while and doing well until the holidays hit.  And everything got very chaotic.  She does have 5 children and additionally works as a realtor.  She does not find any time during the day for herself.  She does have 2 upcoming trips and she does have flight of anxiety.  Look at the other note regarding that.  She like to restart on her sertraline.  She is no longer breast-feeding.

## 2023-03-06 NOTE — H&P
Obstetrics & Gynecology History & Physical Note    Date  3/6/2023    Primary Care Physician.    Gilberto Willingham III, M.D.    CC   Pre- op scheduled for laparoscopic bilateral salpingectomy.     HPI  This is a 37 y.o. female multiparous desires sterilisation currently on Mirena IUD.     Past Medical History:   Diagnosis Date    Chronic bilateral low back pain without sciatica 5/10/2022    Mild intermittent asthma with acute exacerbation 10/20/2022       Past Surgical History:   Procedure Laterality Date    GYN SURGERY      labiaplasty       No current facility-administered medications for this encounter.     Current Outpatient Medications   Medication Sig Dispense Refill    sertraline (ZOLOFT) 100 MG Tab Take 1 Tablet by mouth every day. 90 Tablet 3    hydrOXYzine HCl (ATARAX) 25 MG Tab Take 1 Tablet by mouth 3 times a day as needed for Anxiety. 30 Tablet 1    pantoprazole (PROTONIX) 40 MG Tablet Delayed Response TAKE 1 TABLET BY MOUTH ONCE A DAY 30 MINUTES BEFORE BREAKFAST MEAL      albuterol 108 (90 Base) MCG/ACT Aero Soln inhalation aerosol Inhale 2 Puffs every 6 hours as needed for Shortness of Breath. 1 Each 3       Social History     Socioeconomic History    Marital status:      Spouse name: Not on file    Number of children: Not on file    Years of education: Not on file    Highest education level: Not on file   Occupational History    Not on file   Tobacco Use    Smoking status: Never    Smokeless tobacco: Never   Vaping Use    Vaping Use: Never used   Substance and Sexual Activity    Alcohol use: Not Currently    Drug use: No    Sexual activity: Yes     Partners: Male     Birth control/protection: Pill   Other Topics Concern    Not on file   Social History Narrative    Not on file     Social Determinants of Health     Financial Resource Strain: Not on file   Food Insecurity: Not on file   Transportation Needs: Not on file   Physical Activity: Not on file   Stress: Not on file   Social Connections: Not  on file   Intimate Partner Violence: Not on file   Housing Stability: Not on file       No family history on file.    Allergies  Pcn [penicillins]    Review of Systems    Negative  Paragard IUD for contracpetion desires sterilisation   Anxiety takes xanax /atarax as needed.    Physical Exam  Constitutional:       Appearance: Normal appearance.   HENT:      Head: Normocephalic.      Nose: Nose normal.      Mouth/Throat:      Mouth: Mucous membranes are dry.   Cardiovascular:      Rate and Rhythm: Normal rate and regular rhythm.      Pulses: Normal pulses.      Heart sounds: Normal heart sounds.   Pulmonary:      Effort: Pulmonary effort is normal.   Abdominal:      General: Abdomen is flat. Bowel sounds are normal.      Palpations: Abdomen is soft.   Musculoskeletal:      Cervical back: Normal range of motion.   Skin:     General: Skin is warm.   Neurological:      General: No focal deficit present.      Mental Status: She is alert and oriented to person, place, and time.   Psychiatric:         Mood and Affect: Mood normal.         Behavior: Behavior normal.       Vital Signs   See EMR.                        Assessment/Plan:    38 y/o G 5 P 4 0 1 4      x 4.     for well woman exam.    wants  sterilisation discussed about options  laparoscopic tubal ligation VS salpingectomy Pt desires salpingectomy.  will have Paragard IUD removed after salpingectomy.    * No Diagnosis Codes entered *  Procedure(s):  LAPAROSCOPIC BILATERAL SALPINGECTOMY, ALL INDICATED PROCEDURES  SALPINGECTOMY    Pre-op instructions given.  Operative procedure explained in detail post op recovery reviewed.all questions answered to satisfaction/.

## 2023-03-07 ENCOUNTER — PRE-ADMISSION TESTING (OUTPATIENT)
Dept: ADMISSIONS | Facility: MEDICAL CENTER | Age: 38
End: 2023-03-07
Attending: OBSTETRICS & GYNECOLOGY
Payer: COMMERCIAL

## 2023-03-07 DIAGNOSIS — Z01.812 PRE-OPERATIVE LABORATORY EXAMINATION: ICD-10-CM

## 2023-03-07 LAB
ANION GAP SERPL CALC-SCNC: 11 MMOL/L (ref 7–16)
APPEARANCE UR: CLEAR
BASOPHILS # BLD AUTO: 0.3 % (ref 0–1.8)
BASOPHILS # BLD: 0.02 K/UL (ref 0–0.12)
BILIRUB UR QL STRIP.AUTO: NEGATIVE
BUN SERPL-MCNC: 17 MG/DL (ref 8–22)
CALCIUM SERPL-MCNC: 9.9 MG/DL (ref 8.5–10.5)
CHLORIDE SERPL-SCNC: 104 MMOL/L (ref 96–112)
CO2 SERPL-SCNC: 24 MMOL/L (ref 20–33)
COLOR UR: YELLOW
CREAT SERPL-MCNC: 0.57 MG/DL (ref 0.5–1.4)
EOSINOPHIL # BLD AUTO: 0.03 K/UL (ref 0–0.51)
EOSINOPHIL NFR BLD: 0.4 % (ref 0–6.9)
ERYTHROCYTE [DISTWIDTH] IN BLOOD BY AUTOMATED COUNT: 38.5 FL (ref 35.9–50)
GFR SERPLBLD CREATININE-BSD FMLA CKD-EPI: 119 ML/MIN/1.73 M 2
GLUCOSE SERPL-MCNC: 109 MG/DL (ref 65–99)
GLUCOSE UR STRIP.AUTO-MCNC: NEGATIVE MG/DL
HCG SERPL QL: NEGATIVE
HCT VFR BLD AUTO: 44.8 % (ref 37–47)
HGB BLD-MCNC: 15.2 G/DL (ref 12–16)
IMM GRANULOCYTES # BLD AUTO: 0.03 K/UL (ref 0–0.11)
IMM GRANULOCYTES NFR BLD AUTO: 0.4 % (ref 0–0.9)
KETONES UR STRIP.AUTO-MCNC: NEGATIVE MG/DL
LEUKOCYTE ESTERASE UR QL STRIP.AUTO: NEGATIVE
LYMPHOCYTES # BLD AUTO: 1.42 K/UL (ref 1–4.8)
LYMPHOCYTES NFR BLD: 18.9 % (ref 22–41)
MCH RBC QN AUTO: 30.4 PG (ref 27–33)
MCHC RBC AUTO-ENTMCNC: 33.9 G/DL (ref 33.6–35)
MCV RBC AUTO: 89.6 FL (ref 81.4–97.8)
MICRO URNS: NORMAL
MONOCYTES # BLD AUTO: 0.25 K/UL (ref 0–0.85)
MONOCYTES NFR BLD AUTO: 3.3 % (ref 0–13.4)
NEUTROPHILS # BLD AUTO: 5.76 K/UL (ref 2–7.15)
NEUTROPHILS NFR BLD: 76.7 % (ref 44–72)
NITRITE UR QL STRIP.AUTO: NEGATIVE
NRBC # BLD AUTO: 0 K/UL
NRBC BLD-RTO: 0 /100 WBC
PH UR STRIP.AUTO: 5.5 [PH] (ref 5–8)
PLATELET # BLD AUTO: 238 K/UL (ref 164–446)
PMV BLD AUTO: 10.8 FL (ref 9–12.9)
POTASSIUM SERPL-SCNC: 3.8 MMOL/L (ref 3.6–5.5)
PROT UR QL STRIP: NEGATIVE MG/DL
RBC # BLD AUTO: 5 M/UL (ref 4.2–5.4)
RBC UR QL AUTO: NEGATIVE
SODIUM SERPL-SCNC: 139 MMOL/L (ref 135–145)
SP GR UR STRIP.AUTO: 1.02
UROBILINOGEN UR STRIP.AUTO-MCNC: 0.2 MG/DL
WBC # BLD AUTO: 7.5 K/UL (ref 4.8–10.8)

## 2023-03-07 PROCEDURE — 85025 COMPLETE CBC W/AUTO DIFF WBC: CPT

## 2023-03-07 PROCEDURE — 80048 BASIC METABOLIC PNL TOTAL CA: CPT

## 2023-03-07 PROCEDURE — 84703 CHORIONIC GONADOTROPIN ASSAY: CPT

## 2023-03-07 PROCEDURE — 81003 URINALYSIS AUTO W/O SCOPE: CPT

## 2023-03-07 PROCEDURE — 36415 COLL VENOUS BLD VENIPUNCTURE: CPT

## 2023-03-07 ASSESSMENT — FIBROSIS 4 INDEX: FIB4 SCORE: 0.62

## 2023-03-15 ENCOUNTER — ANESTHESIA EVENT (OUTPATIENT)
Dept: SURGERY | Facility: MEDICAL CENTER | Age: 38
End: 2023-03-15
Payer: COMMERCIAL

## 2023-03-15 ENCOUNTER — ANESTHESIA (OUTPATIENT)
Dept: SURGERY | Facility: MEDICAL CENTER | Age: 38
End: 2023-03-15
Payer: COMMERCIAL

## 2023-03-15 ENCOUNTER — HOSPITAL ENCOUNTER (OUTPATIENT)
Facility: MEDICAL CENTER | Age: 38
End: 2023-03-15
Attending: OBSTETRICS & GYNECOLOGY | Admitting: OBSTETRICS & GYNECOLOGY
Payer: COMMERCIAL

## 2023-03-15 VITALS
DIASTOLIC BLOOD PRESSURE: 82 MMHG | TEMPERATURE: 98.6 F | BODY MASS INDEX: 25.74 KG/M2 | HEIGHT: 67 IN | WEIGHT: 164.02 LBS | RESPIRATION RATE: 25 BRPM | HEART RATE: 55 BPM | OXYGEN SATURATION: 100 % | SYSTOLIC BLOOD PRESSURE: 129 MMHG

## 2023-03-15 DIAGNOSIS — Z98.890 POST-OPERATIVE STATE: ICD-10-CM

## 2023-03-15 LAB — HCG UR QL: NEGATIVE

## 2023-03-15 PROCEDURE — 160035 HCHG PACU - 1ST 60 MINS PHASE I: Performed by: OBSTETRICS & GYNECOLOGY

## 2023-03-15 PROCEDURE — 00840 ANES IPER PX LOWER ABD NOS: CPT | Performed by: ANESTHESIOLOGY

## 2023-03-15 PROCEDURE — 160009 HCHG ANES TIME/MIN: Performed by: OBSTETRICS & GYNECOLOGY

## 2023-03-15 PROCEDURE — A9270 NON-COVERED ITEM OR SERVICE: HCPCS | Performed by: ANESTHESIOLOGY

## 2023-03-15 PROCEDURE — 160028 HCHG SURGERY MINUTES - 1ST 30 MINS LEVEL 3: Performed by: OBSTETRICS & GYNECOLOGY

## 2023-03-15 PROCEDURE — 110371 HCHG SHELL REV 272: Performed by: OBSTETRICS & GYNECOLOGY

## 2023-03-15 PROCEDURE — 700111 HCHG RX REV CODE 636 W/ 250 OVERRIDE (IP): Performed by: ANESTHESIOLOGY

## 2023-03-15 PROCEDURE — 160046 HCHG PACU - 1ST 60 MINS PHASE II: Performed by: OBSTETRICS & GYNECOLOGY

## 2023-03-15 PROCEDURE — 88302 TISSUE EXAM BY PATHOLOGIST: CPT

## 2023-03-15 PROCEDURE — 160025 RECOVERY II MINUTES (STATS): Performed by: OBSTETRICS & GYNECOLOGY

## 2023-03-15 PROCEDURE — 700102 HCHG RX REV CODE 250 W/ 637 OVERRIDE(OP): Performed by: ANESTHESIOLOGY

## 2023-03-15 PROCEDURE — 88300 SURGICAL PATH GROSS: CPT

## 2023-03-15 PROCEDURE — 700105 HCHG RX REV CODE 258: Performed by: OBSTETRICS & GYNECOLOGY

## 2023-03-15 PROCEDURE — 81025 URINE PREGNANCY TEST: CPT

## 2023-03-15 PROCEDURE — 160002 HCHG RECOVERY MINUTES (STAT): Performed by: OBSTETRICS & GYNECOLOGY

## 2023-03-15 PROCEDURE — 700101 HCHG RX REV CODE 250: Performed by: ANESTHESIOLOGY

## 2023-03-15 PROCEDURE — 160039 HCHG SURGERY MINUTES - EA ADDL 1 MIN LEVEL 3: Performed by: OBSTETRICS & GYNECOLOGY

## 2023-03-15 PROCEDURE — 160048 HCHG OR STATISTICAL LEVEL 1-5: Performed by: OBSTETRICS & GYNECOLOGY

## 2023-03-15 RX ORDER — MEPERIDINE HYDROCHLORIDE 25 MG/ML
6.25 INJECTION INTRAMUSCULAR; INTRAVENOUS; SUBCUTANEOUS
Status: DISCONTINUED | OUTPATIENT
Start: 2023-03-15 | End: 2023-03-15 | Stop reason: HOSPADM

## 2023-03-15 RX ORDER — ONDANSETRON 2 MG/ML
INJECTION INTRAMUSCULAR; INTRAVENOUS PRN
Status: DISCONTINUED | OUTPATIENT
Start: 2023-03-15 | End: 2023-03-15 | Stop reason: SURG

## 2023-03-15 RX ORDER — HYDRALAZINE HYDROCHLORIDE 20 MG/ML
5 INJECTION INTRAMUSCULAR; INTRAVENOUS
Status: DISCONTINUED | OUTPATIENT
Start: 2023-03-15 | End: 2023-03-15 | Stop reason: HOSPADM

## 2023-03-15 RX ORDER — ONDANSETRON 2 MG/ML
4 INJECTION INTRAMUSCULAR; INTRAVENOUS
Status: DISCONTINUED | OUTPATIENT
Start: 2023-03-15 | End: 2023-03-15 | Stop reason: HOSPADM

## 2023-03-15 RX ORDER — OXYCODONE HCL 5 MG/5 ML
10 SOLUTION, ORAL ORAL
Status: COMPLETED | OUTPATIENT
Start: 2023-03-15 | End: 2023-03-15

## 2023-03-15 RX ORDER — LIDOCAINE HYDROCHLORIDE 20 MG/ML
INJECTION, SOLUTION EPIDURAL; INFILTRATION; INTRACAUDAL; PERINEURAL PRN
Status: DISCONTINUED | OUTPATIENT
Start: 2023-03-15 | End: 2023-03-15 | Stop reason: SURG

## 2023-03-15 RX ORDER — EPHEDRINE SULFATE 50 MG/ML
5 INJECTION, SOLUTION INTRAVENOUS
Status: DISCONTINUED | OUTPATIENT
Start: 2023-03-15 | End: 2023-03-15 | Stop reason: HOSPADM

## 2023-03-15 RX ORDER — HALOPERIDOL 5 MG/ML
1 INJECTION INTRAMUSCULAR
Status: DISCONTINUED | OUTPATIENT
Start: 2023-03-15 | End: 2023-03-15 | Stop reason: HOSPADM

## 2023-03-15 RX ORDER — OXYCODONE HCL 5 MG/5 ML
5 SOLUTION, ORAL ORAL
Status: COMPLETED | OUTPATIENT
Start: 2023-03-15 | End: 2023-03-15

## 2023-03-15 RX ORDER — DIPHENHYDRAMINE HYDROCHLORIDE 50 MG/ML
12.5 INJECTION INTRAMUSCULAR; INTRAVENOUS
Status: DISCONTINUED | OUTPATIENT
Start: 2023-03-15 | End: 2023-03-15 | Stop reason: HOSPADM

## 2023-03-15 RX ORDER — ROCURONIUM BROMIDE 10 MG/ML
INJECTION, SOLUTION INTRAVENOUS PRN
Status: DISCONTINUED | OUTPATIENT
Start: 2023-03-15 | End: 2023-03-15 | Stop reason: SURG

## 2023-03-15 RX ORDER — HYDROMORPHONE HYDROCHLORIDE 1 MG/ML
0.1 INJECTION, SOLUTION INTRAMUSCULAR; INTRAVENOUS; SUBCUTANEOUS
Status: DISCONTINUED | OUTPATIENT
Start: 2023-03-15 | End: 2023-03-15 | Stop reason: HOSPADM

## 2023-03-15 RX ORDER — HYDROMORPHONE HYDROCHLORIDE 1 MG/ML
0.4 INJECTION, SOLUTION INTRAMUSCULAR; INTRAVENOUS; SUBCUTANEOUS
Status: DISCONTINUED | OUTPATIENT
Start: 2023-03-15 | End: 2023-03-15 | Stop reason: HOSPADM

## 2023-03-15 RX ORDER — ACETAMINOPHEN 500 MG
1000 TABLET ORAL ONCE
Status: COMPLETED | OUTPATIENT
Start: 2023-03-15 | End: 2023-03-15

## 2023-03-15 RX ORDER — DEXAMETHASONE SODIUM PHOSPHATE 4 MG/ML
INJECTION, SOLUTION INTRA-ARTICULAR; INTRALESIONAL; INTRAMUSCULAR; INTRAVENOUS; SOFT TISSUE PRN
Status: DISCONTINUED | OUTPATIENT
Start: 2023-03-15 | End: 2023-03-15 | Stop reason: SURG

## 2023-03-15 RX ORDER — OXYCODONE HYDROCHLORIDE AND ACETAMINOPHEN 5; 325 MG/1; MG/1
1 TABLET ORAL EVERY 4 HOURS PRN
Qty: 15 TABLET | Refills: 0 | Status: SHIPPED | OUTPATIENT
Start: 2023-03-15 | End: 2023-03-19

## 2023-03-15 RX ORDER — IBUPROFEN 600 MG/1
600 TABLET ORAL EVERY 6 HOURS PRN
Qty: 30 TABLET | Refills: 1 | Status: SHIPPED | OUTPATIENT
Start: 2023-03-15 | End: 2024-02-13

## 2023-03-15 RX ORDER — SODIUM CHLORIDE, SODIUM LACTATE, POTASSIUM CHLORIDE, CALCIUM CHLORIDE 600; 310; 30; 20 MG/100ML; MG/100ML; MG/100ML; MG/100ML
INJECTION, SOLUTION INTRAVENOUS CONTINUOUS
Status: DISCONTINUED | OUTPATIENT
Start: 2023-03-15 | End: 2023-03-15 | Stop reason: HOSPADM

## 2023-03-15 RX ORDER — HYDROMORPHONE HYDROCHLORIDE 1 MG/ML
0.2 INJECTION, SOLUTION INTRAMUSCULAR; INTRAVENOUS; SUBCUTANEOUS
Status: DISCONTINUED | OUTPATIENT
Start: 2023-03-15 | End: 2023-03-15 | Stop reason: HOSPADM

## 2023-03-15 RX ORDER — KETOROLAC TROMETHAMINE 30 MG/ML
INJECTION, SOLUTION INTRAMUSCULAR; INTRAVENOUS PRN
Status: DISCONTINUED | OUTPATIENT
Start: 2023-03-15 | End: 2023-03-15 | Stop reason: SURG

## 2023-03-15 RX ADMIN — KETOROLAC TROMETHAMINE 30 MG: 30 INJECTION, SOLUTION INTRAMUSCULAR at 16:25

## 2023-03-15 RX ADMIN — ROCURONIUM BROMIDE 50 MG: 10 INJECTION, SOLUTION INTRAVENOUS at 15:59

## 2023-03-15 RX ADMIN — ONDANSETRON 4 MG: 2 INJECTION INTRAMUSCULAR; INTRAVENOUS at 16:25

## 2023-03-15 RX ADMIN — SUGAMMADEX 200 MG: 100 INJECTION, SOLUTION INTRAVENOUS at 16:25

## 2023-03-15 RX ADMIN — DEXAMETHASONE SODIUM PHOSPHATE 8 MG: 4 INJECTION, SOLUTION INTRA-ARTICULAR; INTRALESIONAL; INTRAMUSCULAR; INTRAVENOUS; SOFT TISSUE at 16:03

## 2023-03-15 RX ADMIN — SODIUM CHLORIDE, POTASSIUM CHLORIDE, SODIUM LACTATE AND CALCIUM CHLORIDE: 600; 310; 30; 20 INJECTION, SOLUTION INTRAVENOUS at 14:57

## 2023-03-15 RX ADMIN — ACETAMINOPHEN 1000 MG: 500 TABLET, FILM COATED ORAL at 14:56

## 2023-03-15 RX ADMIN — OXYCODONE HYDROCHLORIDE 10 MG: 5 SOLUTION ORAL at 16:49

## 2023-03-15 RX ADMIN — LIDOCAINE HYDROCHLORIDE 60 MG: 20 INJECTION, SOLUTION EPIDURAL; INFILTRATION; INTRACAUDAL at 15:59

## 2023-03-15 RX ADMIN — PROPOFOL 200 MG: 10 INJECTION, EMULSION INTRAVENOUS at 15:59

## 2023-03-15 RX ADMIN — PROPOFOL 50 MG: 10 INJECTION, EMULSION INTRAVENOUS at 16:26

## 2023-03-15 RX ADMIN — FENTANYL CITRATE 100 MCG: 50 INJECTION, SOLUTION INTRAMUSCULAR; INTRAVENOUS at 15:55

## 2023-03-15 RX ADMIN — FENTANYL CITRATE 50 MCG: 50 INJECTION, SOLUTION INTRAMUSCULAR; INTRAVENOUS at 16:49

## 2023-03-15 ASSESSMENT — PAIN DESCRIPTION - PAIN TYPE: TYPE: SURGICAL PAIN

## 2023-03-15 ASSESSMENT — PAIN SCALES - GENERAL: PAIN_LEVEL: 4

## 2023-03-15 ASSESSMENT — FIBROSIS 4 INDEX: FIB4 SCORE: 0.6

## 2023-03-15 NOTE — ANESTHESIA PREPROCEDURE EVALUATION
Case: [id number] Date/Time: 03/15/23 1445    Procedures:       LAPAROSCOPIC BILATERAL SALPINGECTOMY, ALL INDICATED PROCEDURES, INTRAUTERINE DEVICE REMOVAL (Bilateral)      REMOVAL, IUD    Pre-op diagnosis: DESIRES STERILIZATION    Location: [facility] ROOM 25 / SURGERY SAME DAY [facility]    Surgeons: Riky Guthrie M.D.          Relevant Problems   PULMONARY   (positive) Mild intermittent asthma with acute exacerbation       Physical Exam    Airway   Mallampati: II  TM distance: >3 FB  Neck ROM: full       Cardiovascular - normal exam  Rhythm: regular  Rate: normal  (-) murmur     Dental - normal exam           Pulmonary - normal exam  Breath sounds clear to auscultation     Abdominal    Neurological - normal exam                 Anesthesia Plan    ASA 2       Plan - general       Airway plan will be ETT          Induction: intravenous          Informed Consent:    Anesthetic plan and risks discussed with patient.

## 2023-03-15 NOTE — ANESTHESIA PROCEDURE NOTES
Airway    Date/Time: 3/15/2023 4:01 PM  Performed by: Melissa Randolph M.D.  Authorized by: Melissa Randolph M.D.     Location:  OR  Urgency:  Elective  Indications for Airway Management:  Anesthesia      Spontaneous Ventilation: absent    Sedation Level:  Deep  Preoxygenated: Yes    Patient Position:  Sniffing  Final Airway Type:  Endotracheal airway  Final Endotracheal Airway:  ETT  Cuffed: Yes    Technique Used for Successful ETT Placement:  Direct laryngoscopy    Insertion Site:  Oral  Blade Type:  Otto  Laryngoscope Blade/Videolaryngoscope Blade Size:  3  ETT Size (mm):  7.0  Measured from:  Teeth  ETT to Teeth (cm):  22  Placement Verified by: auscultation and capnometry    Cormack-Lehane Classification:  Grade IIa - partial view of glottis  Number of Attempts at Approach:  1

## 2023-03-15 NOTE — ANESTHESIA TIME REPORT
Anesthesia Start and Stop Event Times     Date Time Event    3/15/2023 1520 Ready for Procedure     1555 Anesthesia Start     1644 Anesthesia Stop        Responsible Staff  03/15/23    Name Role Begin End    Melissa Randolph M.D. Anesth 1554 1641        Overtime Reason:  per laila, locums, etc.    Comments:

## 2023-03-15 NOTE — OR SURGEON
Immediate Post OP Note      PreOp Diagnosis:   Multipara   Desires sterilisation.       PostOp Diagnosis: Multipara   Desires sterilisation.       Procedure(s):  LAPAROSCOPIC BILATERAL SALPINGECTOMY, ALL INDICATED PROCEDURES, INTRAUTERINE DEVICE REMOVAL - Wound Class: Clean  REMOVAL, IUD - Wound Class: Clean Contaminated    Surgeon(s):  Riky Guthrie M.D.    Anesthesiologist/Type of Anesthesia:  Anesthesiologist: Melissa Randolph M.D./General    Surgical Staff:  Circulator: Leidy Lara R.N.  Scrub Person: Africa Sierra    Specimens removed if any:  ID Type Source Tests Collected by Time Destination   A : BILATERAL FALLOPIAN TUBES AND IUD Tissue Fallopian Tube PATHOLOGY SPECIMEN Riky Guthrie M.D. 3/15/2023  4:22 PM        Estimated Blood Loss: minimal     Findings: normal uterus both tubes and ovaries normal.    Complications: none.         3/15/2023 4:43 PM Riky Guthrie M.D.

## 2023-03-15 NOTE — OP REPORT
PreOp Diagnosis:   Multipara   Desires sterilisation.         PostOp Diagnosis: Multipara   Desires sterilisation.         Procedure(s):  LAPAROSCOPIC BILATERAL SALPINGECTOMY, ALL INDICATED PROCEDURES, INTRAUTERINE DEVICE REMOVAL - Wound Class: Clean  REMOVAL, IUD - Wound Class: Clean Contaminated     Surgeon(s):  Riky Guthrie M.D.     Anesthesiologist/Type of Anesthesia:  Anesthesiologist: Melissa Randolph M.D./General     Surgical Staff:  Circulator: Leidy Lara R.N.  Scrub Person: Africa Sierra     Specimens removed if any:  ID Type Source Tests Collected by Time Destination   A : BILATERAL FALLOPIAN TUBES AND IUD Tissue Fallopian Tube PATHOLOGY SPECIMEN Riky Guthrie M.D. 3/15/2023  4:22 PM           Estimated Blood Loss: minimal      Findings: normal uterus both tubes and ovaries normal.     Complications: none.     PROCEDURE IN DETAIL:  The patient was taken to the operating room where general anesthesia was obtained without difficulty.  The patient was then examined under anesthesia, found to have an anteverted, normal size uterus with normal adnexa.  She was then placed in the dorsal lithotomy position and prepped and draped in a sterile fashion.  The bladder was  straight  catheterized.    A bivalve speculum was then placed in the patient's vagina and the anterior lip of the cervix grasped with a single-tooth tenaculum. IUD string held with sponge stick pulled out sent for pathology. A HULKAuterine manipulator was advanced into the uterus to provide a means to manipulate the uterus.  The speculum was removed from the vagina.  Attention was then turned to the patient's abdomen after regloving where, infraumbillical skin infiltrated with marcaine with epi, a 10 mm skin incision was made in the umbilical fold.  The Veress needle was carefully introduced into the peritoneal cavity at a 45-degree angle while tenting the abdominal wall.  Intraperitoneal placement was confirmed by low pressures  peritoneal cavity    Pneumoperitoneum was obtained with 4 L of CO2 gas and a10 MM trocar and sleeve were then advanced without difficulty into the abdomen where intra-abdominal placement was confirmed by the laparoscope.  A second skin incision was made 2 cm above the symphysis pubis in the midline.  The second trocar and sleeve   were then advanced under direct visualization.     A survey of the patient's pelvis and abdomen revealed entirely normal anatomy and then the ligasure was advanced through the operative channel of the operative scope, and the left tube was held  up  with prestige clamp exposing the mesosalpinx , with ligasure clamped ,coagulated and cut  ,the dissection was carried further along  mesosalpinx parallel to the tube in the mesosalpinx until reached the uterine cornu then the segment of tube was grasped coagulated and cut tube was brought out the umbillical port and sent for pathology  The same thing was repeated on the right side.  There was no bleeding in the mesosalpinx.  The instruments were then removed from the patient's abdomen and the incision repaired.  Under direct laparoscopic visualization, the suprapubic trocar was removed.  No bleeding was noted.  Then, the scope was removed, advanced further out of the abdominal and no bleeding was noted from the umbilical incisions.  The fascia in the umbilical incision was closed using 0 Vicryl under direct visualization.  The skin on both ports was closed using 4-0 Monocryl.  The HULKA was then removed from the   vagina with no bleeding noted from the cervix.  The patient tolerated the procedure well.  Sponge, lap and needle counts were correct x2.  The patient was taken to the recovery room in stable condition.

## 2023-03-15 NOTE — OR NURSING
1641- Pt arrived to PACU, report received.  Pt on 6L mask. 2 lap sites to abd with gauze and tegaderm , both CDI.  Peripad in place also CDI.      1645- Pt tolerating sips of water at this time.  Pt with pain 7/10, medicated per MAR.  Heat packs provided to abd.      1715- phase 2 met.     1730- Pt taken to restroom to void and change into own clothes. Pad is CDI.  Pt voided without complication.      1748- IV dc with tip intact.     1750- Discharge instructions discussed with pt and .  All questions answered at this time.  Pt discharged home with all belongings.

## 2023-03-16 LAB — PATHOLOGY CONSULT NOTE: NORMAL

## 2023-03-16 NOTE — DISCHARGE INSTRUCTIONS
HOME CARE INSTRUCTIONS    ACTIVITY: Rest and take it easy for the first 24 hours.  A responsible adult is recommended to remain with you during that time.  It is normal to feel sleepy.  We encourage you to not do anything that requires balance, judgment or coordination.    FOR 24 HOURS DO NOT:  Drive, operate machinery or run household appliances.  Drink beer or alcoholic beverages.  Make important decisions or sign legal documents.    SPECIAL INSTRUCTIONS: See handout at front of packet.      DIET: To avoid nausea, slowly advance diet as tolerated, avoiding spicy or greasy foods for the first day.  Add more substantial food to your diet according to your physician's instructions.  Babies can be fed formula or breast milk as soon as they are hungry.  INCREASE FLUIDS AND FIBER TO AVOID CONSTIPATION.    SURGICAL DRESSING/BATHING: OK to shower tomorrow.  Do not submerge in any water for 2 weeks.  Pelvic rest (nothing inserted vaginally) for 2 weeks. OK to remove the dressings on your stomach in 48 hours.      MEDICATIONS: Resume taking daily medication.  Take prescribed pain medication with food.  If no medication is prescribed, you may take non-aspirin pain medication if needed.  PAIN MEDICATION CAN BE VERY CONSTIPATING.  Take a stool softener or laxative such as senokot, pericolace, or milk of magnesia if needed.    Prescription given for Percocet (do not take additional Tylenol while using this med).   Last pain medication given: You had 1000mg Tylenol at 3pm.  You had Toradol (an NSAID like Motrin) at 4:25pm.  OK to take Motrin again at 10:25pm.  You had 10mg Oxycodone at 4:45pm.  OK to take Percocet again at 8:45pm.    A follow-up appointment should be arranged with your doctor; call to schedule.    You should CALL YOUR PHYSICIAN if you develop:  Fever greater than 101 degrees F.  Pain not relieved by medication, or persistent nausea or vomiting.  Excessive bleeding (blood soaking through dressing) or unexpected  drainage from the wound.  Extreme redness or swelling around the incision site, drainage of pus or foul smelling drainage.  Inability to urinate or empty your bladder within 8 hours.  Problems with breathing or chest pain.    You should call 911 if you develop problems with breathing or chest pain.  If you are unable to contact your doctor or surgical center, you should go to the nearest emergency room or urgent care center.  Physician's telephone #: Dr. Guthrie [phone]    MILD FLU-LIKE SYMPTOMS ARE NORMAL.  YOU MAY EXPERIENCE GENERALIZED MUSCLE ACHES, THROAT IRRITATION, HEADACHE AND/OR SOME NAUSEA.    If any questions arise, call your doctor.  If your doctor is not available, please feel free to call the Surgical Center at [phone].  The Center is open Monday through Friday from 7AM to 7PM.      A registered nurse may call you a few days after your surgery to see how you are doing after your procedure.    You may also receive a survey in the mail within the next two weeks and we ask that you take a few moments to complete the survey and return it to us.  Our goal is to provide you with very good care and we value your comments.     Depression / Suicide Risk    As you are discharged from this [facility] facility, it is important to learn how to keep safe from harming yourself.    Recognize the warning signs:  Abrupt changes in personality, positive or negative- including increase in energy   Giving away possessions  Change in eating patterns- significant weight changes-  positive or negative  Change in sleeping patterns- unable to sleep or sleeping all the time   Unwillingness or inability to communicate  Depression  Unusual sadness, discouragement and loneliness  Talk of wanting to die  Neglect of personal appearance   Rebelliousness- reckless behavior  Withdrawal from people/activities they love  Confusion- inability to concentrate     If you or a loved one observes any of these behaviors or has  concerns about self-harm, here's what you can do:  Talk about it- your feelings and reasons for harming yourself  Remove any means that you might use to hurt yourself (examples: pills, rope, extension cords, firearm)  Get professional help from the community (Mental Health, Substance Abuse, psychological counseling)  Do not be alone:Call your Safe Contact- someone whom you trust who will be there for you.  Call your local CRISIS HOTLINE 724-4474 or 059-992-6916  Call your local Children's Mobile Crisis Response Team [place] (561) 281-9650 or www.[employer]  Call the toll free National Suicide Prevention Hotlines   National Suicide Prevention Lifeline 342-174-ZEZT (5792)  National Hope Line Network 800-SUICIDE (701-7300)    I acknowledge receipt and understanding of these Home Care instructions.

## 2023-03-16 NOTE — ANESTHESIA POSTPROCEDURE EVALUATION
Patient: Chely Pizarro    Procedure Summary     Date: 03/15/23 Room / Location: [facility] ROOM 25 / SURGERY SAME DAY [facility]    Anesthesia Start: 1555 Anesthesia Stop: 1644    Procedures:       LAPAROSCOPIC BILATERAL SALPINGECTOMY, ALL INDICATED PROCEDURES, INTRAUTERINE DEVICE REMOVAL (Bilateral: Abdomen)      REMOVAL, IUD (Vagina ) Diagnosis: (DESIRES STERILIZATION)    Surgeons: Riky Guthrie M.D. Responsible Provider: Melissa Randolph M.D.    Anesthesia Type: general ASA Status: 2          Final Anesthesia Type: general  Last vitals  BP   Blood Pressure: 129/82    Temp   37 °C (98.6 °F)    Pulse   (!) 55   Resp   16    SpO2   100 %      Anesthesia Post Evaluation    Patient location during evaluation: PACU  Patient participation: complete - patient participated  Level of consciousness: awake and alert  Pain score: 4    Airway patency: patent  Anesthetic complications: no  Cardiovascular status: hemodynamically stable  Respiratory status: acceptable  Hydration status: euvolemic    PONV: none          No notable events documented.     Nurse Pain Score: 4 (NPRS)

## 2023-08-02 ENCOUNTER — HOSPITAL ENCOUNTER (OUTPATIENT)
Dept: LAB | Facility: MEDICAL CENTER | Age: 38
End: 2023-08-02
Attending: FAMILY MEDICINE
Payer: COMMERCIAL

## 2023-08-02 ENCOUNTER — OFFICE VISIT (OUTPATIENT)
Dept: MEDICAL GROUP | Facility: PHYSICIAN GROUP | Age: 38
End: 2023-08-02
Payer: COMMERCIAL

## 2023-08-02 VITALS
WEIGHT: 163 LBS | SYSTOLIC BLOOD PRESSURE: 126 MMHG | TEMPERATURE: 97.8 F | DIASTOLIC BLOOD PRESSURE: 80 MMHG | HEIGHT: 67 IN | BODY MASS INDEX: 25.58 KG/M2 | OXYGEN SATURATION: 97 % | RESPIRATION RATE: 18 BRPM | HEART RATE: 70 BPM

## 2023-08-02 DIAGNOSIS — Z00.00 WELLNESS EXAMINATION: ICD-10-CM

## 2023-08-02 DIAGNOSIS — F41.9 ANXIETY: ICD-10-CM

## 2023-08-02 PROBLEM — K59.1 FUNCTIONAL DIARRHEA: Status: RESOLVED | Noted: 2022-05-10 | Resolved: 2023-08-02

## 2023-08-02 PROBLEM — R42 VERTIGO: Status: RESOLVED | Noted: 2021-12-09 | Resolved: 2023-08-02

## 2023-08-02 LAB
ALBUMIN SERPL BCP-MCNC: 4.7 G/DL (ref 3.2–4.9)
ALBUMIN/GLOB SERPL: 1.7 G/DL
ALP SERPL-CCNC: 41 U/L (ref 30–99)
ALT SERPL-CCNC: 10 U/L (ref 2–50)
ANION GAP SERPL CALC-SCNC: 12 MMOL/L (ref 7–16)
APPEARANCE UR: ABNORMAL
AST SERPL-CCNC: 13 U/L (ref 12–45)
BACTERIA #/AREA URNS HPF: ABNORMAL /HPF
BASOPHILS # BLD AUTO: 0.6 % (ref 0–1.8)
BASOPHILS # BLD: 0.03 K/UL (ref 0–0.12)
BILIRUB SERPL-MCNC: 0.6 MG/DL (ref 0.1–1.5)
BILIRUB UR QL STRIP.AUTO: NEGATIVE
BUN SERPL-MCNC: 16 MG/DL (ref 8–22)
CALCIUM ALBUM COR SERPL-MCNC: 9.3 MG/DL (ref 8.5–10.5)
CALCIUM SERPL-MCNC: 9.9 MG/DL (ref 8.5–10.5)
CHLORIDE SERPL-SCNC: 102 MMOL/L (ref 96–112)
CHOLEST SERPL-MCNC: 181 MG/DL (ref 100–199)
CO2 SERPL-SCNC: 22 MMOL/L (ref 20–33)
COLOR UR: YELLOW
CREAT SERPL-MCNC: 0.74 MG/DL (ref 0.5–1.4)
EOSINOPHIL # BLD AUTO: 0.02 K/UL (ref 0–0.51)
EOSINOPHIL NFR BLD: 0.4 % (ref 0–6.9)
EPI CELLS #/AREA URNS HPF: ABNORMAL /HPF
ERYTHROCYTE [DISTWIDTH] IN BLOOD BY AUTOMATED COUNT: 37.9 FL (ref 35.9–50)
FASTING STATUS PATIENT QL REPORTED: NORMAL
GFR SERPLBLD CREATININE-BSD FMLA CKD-EPI: 106 ML/MIN/1.73 M 2
GLOBULIN SER CALC-MCNC: 2.7 G/DL (ref 1.9–3.5)
GLUCOSE SERPL-MCNC: 86 MG/DL (ref 65–99)
GLUCOSE UR STRIP.AUTO-MCNC: NEGATIVE MG/DL
HCT VFR BLD AUTO: 42.9 % (ref 37–47)
HDLC SERPL-MCNC: 50 MG/DL
HGB BLD-MCNC: 14.9 G/DL (ref 12–16)
HYALINE CASTS #/AREA URNS LPF: ABNORMAL /LPF
IMM GRANULOCYTES # BLD AUTO: 0.01 K/UL (ref 0–0.11)
IMM GRANULOCYTES NFR BLD AUTO: 0.2 % (ref 0–0.9)
KETONES UR STRIP.AUTO-MCNC: NEGATIVE MG/DL
LDLC SERPL CALC-MCNC: 106 MG/DL
LEUKOCYTE ESTERASE UR QL STRIP.AUTO: ABNORMAL
LYMPHOCYTES # BLD AUTO: 1.71 K/UL (ref 1–4.8)
LYMPHOCYTES NFR BLD: 31.8 % (ref 22–41)
MCH RBC QN AUTO: 30.3 PG (ref 27–33)
MCHC RBC AUTO-ENTMCNC: 34.7 G/DL (ref 32.2–35.5)
MCV RBC AUTO: 87.2 FL (ref 81.4–97.8)
MICRO URNS: ABNORMAL
MONOCYTES # BLD AUTO: 0.29 K/UL (ref 0–0.85)
MONOCYTES NFR BLD AUTO: 5.4 % (ref 0–13.4)
NEUTROPHILS # BLD AUTO: 3.32 K/UL (ref 1.82–7.42)
NEUTROPHILS NFR BLD: 61.6 % (ref 44–72)
NITRITE UR QL STRIP.AUTO: NEGATIVE
NRBC # BLD AUTO: 0 K/UL
NRBC BLD-RTO: 0 /100 WBC (ref 0–0.2)
PH UR STRIP.AUTO: 7 [PH] (ref 5–8)
PLATELET # BLD AUTO: 266 K/UL (ref 164–446)
PMV BLD AUTO: 10.8 FL (ref 9–12.9)
POTASSIUM SERPL-SCNC: 4.2 MMOL/L (ref 3.6–5.5)
PROT SERPL-MCNC: 7.4 G/DL (ref 6–8.2)
PROT UR QL STRIP: NEGATIVE MG/DL
RBC # BLD AUTO: 4.92 M/UL (ref 4.2–5.4)
RBC # URNS HPF: ABNORMAL /HPF
RBC UR QL AUTO: ABNORMAL
SODIUM SERPL-SCNC: 136 MMOL/L (ref 135–145)
SP GR UR STRIP.AUTO: 1.02
TRIGL SERPL-MCNC: 125 MG/DL (ref 0–149)
TSH SERPL DL<=0.005 MIU/L-ACNC: 0.79 UIU/ML (ref 0.38–5.33)
UROBILINOGEN UR STRIP.AUTO-MCNC: 0.2 MG/DL
WBC # BLD AUTO: 5.4 K/UL (ref 4.8–10.8)
WBC #/AREA URNS HPF: ABNORMAL /HPF

## 2023-08-02 PROCEDURE — 36415 COLL VENOUS BLD VENIPUNCTURE: CPT

## 2023-08-02 PROCEDURE — 3079F DIAST BP 80-89 MM HG: CPT | Performed by: FAMILY MEDICINE

## 2023-08-02 PROCEDURE — 84443 ASSAY THYROID STIM HORMONE: CPT

## 2023-08-02 PROCEDURE — 80053 COMPREHEN METABOLIC PANEL: CPT

## 2023-08-02 PROCEDURE — 80061 LIPID PANEL: CPT

## 2023-08-02 PROCEDURE — 3074F SYST BP LT 130 MM HG: CPT | Performed by: FAMILY MEDICINE

## 2023-08-02 PROCEDURE — 81001 URINALYSIS AUTO W/SCOPE: CPT

## 2023-08-02 PROCEDURE — 85025 COMPLETE CBC W/AUTO DIFF WBC: CPT

## 2023-08-02 PROCEDURE — 99395 PREV VISIT EST AGE 18-39: CPT | Performed by: FAMILY MEDICINE

## 2023-08-02 ASSESSMENT — FIBROSIS 4 INDEX: FIB4 SCORE: 0.6

## 2023-08-02 NOTE — PROGRESS NOTES
Subjective:     CC: Here for her wellness exam and to discuss her anxiety.    HPI:   Chely presents today with the following medical concerns:    Wellness examination  Here today for her wellness exam and to get baseline labs ordered.  She has had a great increase in her anxiety recently as she has had a best friend passed away and another 1 be diagnosed with stage IV breast cancer.  All this has her extremely concerned over her health.      Anxiety  This is a chronic problem.  Patient's had a great exacerbation of her anxiety.  She has been having panic attacks over the last month.  She has had some friends with serious health concerns and 1 passed away.  As a result it has increased her anxiety over her own health and wellbeing.  She did try to take the sertraline at one half of the 100 mg dose but it made her extremely anxious and manic.  She has not repeated that dose.  At this time she does not want to take any prescription medications.  She been trying meditating, exercise and microdosing mushrooms.  She states she feels better today.  She does have a pending visit with her counselor.    Past Medical History:   Diagnosis Date    Acid reflux 2023    Chronic bilateral low back pain without sciatica 05/10/2022    Indigestion 2023    Mild intermittent asthma with acute exacerbation 10/20/2022    Psychiatric problem 2023    anxiety       Social History     Tobacco Use    Smoking status: Never    Smokeless tobacco: Never   Vaping Use    Vaping Use: Never used   Substance Use Topics    Alcohol use: Yes     Comment: 2 per month    Drug use: No       Current Outpatient Medications Ordered in Epic   Medication Sig Dispense Refill    ibuprofen (MOTRIN) 600 MG Tab Take 1 Tablet by mouth every 6 hours as needed for Mild Pain or Moderate Pain. 30 Tablet 1    Multiple Vitamin (MULTIVITAMIN ADULT PO) Take 1 Tablet by mouth every day.  Vitamin      hydrOXYzine HCl (ATARAX) 25 MG Tab Take 1 Tablet by  "mouth 3 times a day as needed for Anxiety. 30 Tablet 1    pantoprazole (PROTONIX) 40 MG Tablet Delayed Response TAKE 1 TABLET BY MOUTH ONCE A DAY 30 MINUTES BEFORE BREAKFAST MEAL       No current Epic-ordered facility-administered medications on file.       Allergies:  Pcn [penicillins]    Health Maintenance: Completed    ROS:  Gen: no fevers/chills, no changes in weight  Eyes: no changes in vision  ENT: no sore throat, no hearing loss, no bloody nose  Pulm: no sob, no cough  CV: no chest pain, no palpitations  GI: no nausea/vomiting, no diarrhea  : no dysuria  MSk: no myalgias  Skin: no rash  Neuro: no headaches, no numbness/tingling  Heme/Lymph: no easy bruising      Objective:       Exam:  /80 (BP Location: Right arm, Patient Position: Sitting, BP Cuff Size: Adult)   Pulse 70   Temp 36.6 °C (97.8 °F) (Temporal)   Resp 18   Ht 1.702 m (5' 7\")   Wt 73.9 kg (163 lb)   SpO2 97%   BMI 25.53 kg/m²  Body mass index is 25.53 kg/m².    Gen: Alert and oriented, No apparent distress.  Eyes:   Extraocular motions intact.  No scleral icterus seen.  ENT:    Ear canals and TMs are clear.  Throat is clear.  Neck: Neck is supple without lymphadenopathy.  Thyroid exam is normal.  Lungs: Normal effort, CTA bilaterally, no wheezes, rhonchi, or rales  CV: Regular rate and rhythm. No murmurs, rubs, or gallops.  Abdomen: Soft, nontender, no organomegaly or masses.  Normal bowel sounds.  Ext: No clubbing, cyanosis, edema.  Neuro: Cranial nerves II through VIII are grossly intact.  No lateralized signs are seen.  Gait is normal.  Psych: Patient is alert and cooperative.  No unusual thought process expressed.  Insight and judgment is good.  She does appear to be mild to moderately anxious on today's visit.  Does not appear to be depressed.  No suicidal ideations.      Labs: Ordered    Assessment & Plan:     37 y.o. female with the following -     1. Wellness examination  Patient's wellness exam was done today.  Baseline " labs ordered.  I reassured her that her exam looks normal and we will see what her labs show.  - Comp Metabolic Panel; Future  - Lipid Profile; Future  - TSH WITH REFLEX TO FT4; Future  - URINALYSIS,CULTURE IF INDICATED; Future  - CBC WITH DIFFERENTIAL; Future  - ESTIMATED GFR; Future    2. Anxiety  This is a chronic problem.  She is to see her counselor soon.  For now she wants to continue what she is doing with meditation and mushrooms as well as exercise.  I told her to not take the sertraline since she had such a bad adverse effect from it.  If she wants to try another medication in the near future we could try Wellbutrin.  She did try some buspirone that she had but it helped a little but not enough.  The hydroxyzine she had makes her too sleepy so she does not use that.  She is under a lot of stress as her  is planning for them to move back to [place] and she is not too happy about that.  All the stresses that she has had recently have probably exacerbated her underlying anxiety.    Anticipatory guidance: Patient talked to about healthy lifestyle as well as exercise.  She is on a healthy diet.  Return in about 1 year (around 8/2/2024) for annual exam.    Please note that this dictation was created using voice recognition software. I have made every reasonable attempt to correct obvious errors, but I expect that there are errors of grammar and possibly content that I did not discover before finalizing the note.

## 2023-08-02 NOTE — ASSESSMENT & PLAN NOTE
This is a chronic problem.  Patient's had a great exacerbation of her anxiety.  She has been having panic attacks over the last month.  She has had some friends with serious health concerns and 1 passed away.  As a result it has increased her anxiety over her own health and wellbeing.  She did try to take the sertraline at one half of the 100 mg dose but it made her extremely anxious and manic.  She has not repeated that dose.  At this time she does not want to take any prescription medications.  She been trying meditating, exercise and microdosing mushrooms.  She states she feels better today.  She does have a pending visit with her counselor.

## 2023-08-02 NOTE — ASSESSMENT & PLAN NOTE
Here today for her wellness exam and to get baseline labs ordered.  She has had a great increase in her anxiety recently as she has had a best friend passed away and another 1 be diagnosed with stage IV breast cancer.  All this has her extremely concerned over her health.

## 2023-08-03 DIAGNOSIS — R89.9 ABNORMAL LABORATORY TEST: ICD-10-CM

## 2023-08-09 RX ORDER — BUSPIRONE HYDROCHLORIDE 5 MG/1
5 TABLET ORAL 3 TIMES DAILY
Qty: 90 TABLET | Refills: 0 | Status: SHIPPED | OUTPATIENT
Start: 2023-08-09 | End: 2023-09-06

## 2023-08-09 RX ORDER — BUPROPION HYDROCHLORIDE 75 MG/1
TABLET ORAL
Qty: 60 TABLET | Refills: 3 | Status: SHIPPED | OUTPATIENT
Start: 2023-08-09 | End: 2024-02-13

## 2023-09-06 RX ORDER — BUPROPION HYDROCHLORIDE 75 MG/1
TABLET ORAL
Qty: 60 TABLET | Refills: 3 | OUTPATIENT
Start: 2023-09-06

## 2023-09-06 RX ORDER — BUSPIRONE HYDROCHLORIDE 5 MG/1
5 TABLET ORAL 3 TIMES DAILY
Qty: 90 TABLET | Refills: 0 | Status: SHIPPED | OUTPATIENT
Start: 2023-09-06 | End: 2023-09-11

## 2023-09-06 NOTE — TELEPHONE ENCOUNTER
Called pharmacy they stated they got the prescriptions however she does not have any refills for her buspirone. They started working on the bupropion

## 2023-09-11 RX ORDER — BUSPIRONE HYDROCHLORIDE 5 MG/1
5 TABLET ORAL 3 TIMES DAILY
Qty: 270 TABLET | Refills: 0 | Status: SHIPPED | OUTPATIENT
Start: 2023-09-11 | End: 2024-02-13

## 2023-11-01 ENCOUNTER — PATIENT MESSAGE (OUTPATIENT)
Dept: MEDICAL GROUP | Facility: PHYSICIAN GROUP | Age: 38
End: 2023-11-01
Payer: COMMERCIAL

## 2023-11-02 RX ORDER — ALBUTEROL SULFATE 90 UG/1
2 AEROSOL, METERED RESPIRATORY (INHALATION) EVERY 6 HOURS PRN
Qty: 1 EACH | Refills: 3 | Status: SHIPPED | OUTPATIENT
Start: 2023-11-02 | End: 2023-12-19 | Stop reason: SDUPTHER

## 2023-12-01 ENCOUNTER — OFFICE VISIT (OUTPATIENT)
Dept: URGENT CARE | Facility: PHYSICIAN GROUP | Age: 38
End: 2023-12-01
Payer: COMMERCIAL

## 2023-12-01 ENCOUNTER — HOSPITAL ENCOUNTER (OUTPATIENT)
Facility: MEDICAL CENTER | Age: 38
End: 2023-12-01
Payer: COMMERCIAL

## 2023-12-01 VITALS
WEIGHT: 167.55 LBS | RESPIRATION RATE: 16 BRPM | TEMPERATURE: 97.7 F | SYSTOLIC BLOOD PRESSURE: 116 MMHG | DIASTOLIC BLOOD PRESSURE: 72 MMHG | HEART RATE: 79 BPM | HEIGHT: 67 IN | OXYGEN SATURATION: 98 % | BODY MASS INDEX: 26.3 KG/M2

## 2023-12-01 DIAGNOSIS — N89.8 VAGINAL IRRITATION: ICD-10-CM

## 2023-12-01 LAB
APPEARANCE UR: NORMAL
BILIRUB UR STRIP-MCNC: NEGATIVE MG/DL
COLOR UR AUTO: YELLOW
GLUCOSE UR STRIP.AUTO-MCNC: NEGATIVE MG/DL
KETONES UR STRIP.AUTO-MCNC: NEGATIVE MG/DL
LEUKOCYTE ESTERASE UR QL STRIP.AUTO: NEGATIVE
NITRITE UR QL STRIP.AUTO: NEGATIVE
PH UR STRIP.AUTO: 7 [PH] (ref 5–8)
PROT UR QL STRIP: NEGATIVE MG/DL
RBC UR QL AUTO: NEGATIVE
SP GR UR STRIP.AUTO: 1.02
UROBILINOGEN UR STRIP-MCNC: 0.2 MG/DL

## 2023-12-01 PROCEDURE — 87510 GARDNER VAG DNA DIR PROBE: CPT

## 2023-12-01 PROCEDURE — 3074F SYST BP LT 130 MM HG: CPT

## 2023-12-01 PROCEDURE — 81002 URINALYSIS NONAUTO W/O SCOPE: CPT

## 2023-12-01 PROCEDURE — 87591 N.GONORRHOEAE DNA AMP PROB: CPT

## 2023-12-01 PROCEDURE — 87660 TRICHOMONAS VAGIN DIR PROBE: CPT

## 2023-12-01 PROCEDURE — 87491 CHLMYD TRACH DNA AMP PROBE: CPT

## 2023-12-01 PROCEDURE — 3078F DIAST BP <80 MM HG: CPT

## 2023-12-01 PROCEDURE — 87480 CANDIDA DNA DIR PROBE: CPT

## 2023-12-01 PROCEDURE — 99213 OFFICE O/P EST LOW 20 MIN: CPT

## 2023-12-01 ASSESSMENT — ENCOUNTER SYMPTOMS
FLANK PAIN: 0
CHILLS: 0
ABDOMINAL PAIN: 0
BACK PAIN: 0
FEVER: 0

## 2023-12-01 ASSESSMENT — FIBROSIS 4 INDEX: FIB4 SCORE: 0.6

## 2023-12-01 NOTE — PROGRESS NOTES
CHIEF COMPLAINT  Chief Complaint   Patient presents with    Vaginal Pain     X1week. Vaginal discomfort, no discharge     Subjective:   Chely Pizarro is a 38 y.o. female who presents for Vaginal Pain (X1week. Vaginal discomfort, no discharge)  Patient presents to urgent care with complaints of vaginal discomfort and foul odor x1 week.  She reports using a boric acid vaginal suppository on Monday with improvement in symptoms of foul smell, but is still having vaginal irritation.  She denies any abnormal vaginal discharge or fever.  Reports mild cramping but denies any abdominal pain.  She denies any history of antibiotic use.  Patient is also current concerned at the potential for STDs, as she accidentally shared a feminine hygiene product with a friend.  She denies any dysuria, frequency or urgency. Denies any pertinent past medical history.     Review of Systems   Constitutional:  Negative for chills and fever.   Gastrointestinal:  Negative for abdominal pain.   Genitourinary:  Negative for dysuria, flank pain, frequency, hematuria and urgency.   Musculoskeletal:  Negative for back pain.       PAST MEDICAL HISTORY  Patient Active Problem List    Diagnosis Date Noted    Wellness examination 08/02/2023    Anxiety with flying 01/27/2023    Mild intermittent asthma with acute exacerbation 10/20/2022    Seasonal allergies 10/20/2022    Chronic bilateral low back pain without sciatica 05/10/2022    Anxiety 12/09/2021    Pre-syncope 11/10/2021    Fatigue 10/18/2017       SURGICAL HISTORY   has a past surgical history that includes gyn surgery (2012); lap,diagnostic abdomen (Bilateral, 3/15/2023); and insertion or removal, iud (3/15/2023).    ALLERGIES  Allergies   Allergen Reactions    Pcn [Penicillins] Rash     RASH       CURRENT MEDICATIONS  Home Medications       Reviewed by Duncan Roberts Ass't (Medical Assistant) on 12/01/23 at 1137  Med List Status: <None>     Medication Last Dose Status   albuterol  "108 (90 Base) MCG/ACT Aero Soln inhalation aerosol PRN Active   buPROPion (WELLBUTRIN) 75 MG Tab Not Taking Active   busPIRone (BUSPAR) 5 MG tablet Not Taking Active   hydrOXYzine HCl (ATARAX) 25 MG Tab Not Taking Active   ibuprofen (MOTRIN) 600 MG Tab Not Taking Active   methylPREDNISolone (MEDROL DOSEPAK) 4 MG Tablet Therapy Pack Not Taking Active   Multiple Vitamin (MULTIVITAMIN ADULT PO) Taking Active   pantoprazole (PROTONIX) 40 MG Tablet Delayed Response Taking Active                    SOCIAL HISTORY  Social History     Tobacco Use    Smoking status: Never    Smokeless tobacco: Never   Vaping Use    Vaping Use: Never used   Substance and Sexual Activity    Alcohol use: Yes     Comment: 2 per month    Drug use: No    Sexual activity: Yes     Partners: Male     Birth control/protection: Pill       FAMILY HISTORY  No family history on file.      Medications, Allergies, and current problem list reviewed today in Epic.     Objective:     /72   Pulse 79   Temp 36.5 °C (97.7 °F) (Temporal)   Resp 16   Ht 1.702 m (5' 7\")   Wt 76 kg (167 lb 8.8 oz)   SpO2 98%     Physical Exam  Vitals reviewed.   Constitutional:       Appearance: Normal appearance.   Cardiovascular:      Rate and Rhythm: Normal rate.   Pulmonary:      Effort: Pulmonary effort is normal. No respiratory distress.      Breath sounds: No wheezing, rhonchi or rales.   Abdominal:      General: Abdomen is flat. There is no distension.      Tenderness: There is no right CVA tenderness or left CVA tenderness.   Skin:     General: Skin is warm.      Capillary Refill: Capillary refill takes less than 2 seconds.   Neurological:      General: No focal deficit present.      Mental Status: She is alert.   Psychiatric:         Mood and Affect: Mood normal.         Behavior: Behavior normal.           Lab Results/POC Test Results   Results for orders placed or performed in visit on 12/01/23   POCT Urinalysis   Result Value Ref Range    POC Color yellow " Negative    POC Appearance slightly cloudy Negative    POC Glucose negative Negative mg/dL    POC Bilirubin negative Negative mg/dL    POC Ketones negative Negative mg/dL    POC Specific Gravity 1.025 <1.005 - >1.030    POC Blood negative Negative    POC Urine PH 7.0 5.0 - 8.0    POC Protein negative Negative mg/dL    POC Urobiligen 0.2 Negative (0.2) mg/dL    POC Nitrites negative Negative    POC Leukocyte Esterase negative Negative          Assessment/Plan:     Diagnosis and associated orders:     1. Vaginal irritation  VAGINAL PATHOGENS DNA PANEL    Chlamydia/GC, PCR (Genital/Anal swab)         Comments/MDM:     Patient presents urgent care with complaints of vaginal irritation and odor x1 week.  She reports taking a boric acid vaginal suppository Monday with alleviation of foul odor, but is still having irritation.  Patient is alert and in no acute distress.  She is not ill appearing.  Negative bilateral CVA tenderness.  Abdomen is flat soft.  No tenderness with palpation.  Vital signs are stable in clinic, she is afebrile.  Patient is concerned about the potential for chlamydia and gonorrhea as she recently shared a feminine hygiene product by accident with a friend.  Point-of-care UA and symptoms non concerning for UTI.  Obtained swab for Vaginal pathogens, and chlamydia/gonorrhea.  Patient has scheduled OB/GYN appointment next week.  Instructed patient to follow-up with OB/GYN or return to ER or urgent care if symptoms worsen or fail to improve.         Differential diagnosis, natural history, supportive care, and indications for immediate follow-up discussed.    Advised the patient to follow-up with the primary care physician for recheck, reevaluation, and consideration of further management.    Please note that this dictation was created using voice recognition software. I have made a reasonable attempt to correct obvious errors, but I expect that there are errors of grammar and possibly content that I did  not discover before finalizing the note.    This note was electronically signed by ISABEL Brandt

## 2023-12-02 DIAGNOSIS — N76.0 BACTERIAL VAGINOSIS: ICD-10-CM

## 2023-12-02 DIAGNOSIS — B96.89 BACTERIAL VAGINOSIS: ICD-10-CM

## 2023-12-02 LAB
C TRACH DNA GENITAL QL NAA+PROBE: NEGATIVE
CANDIDA DNA VAG QL PROBE+SIG AMP: NEGATIVE
G VAGINALIS DNA VAG QL PROBE+SIG AMP: POSITIVE
N GONORRHOEA DNA GENITAL QL NAA+PROBE: NEGATIVE
SPECIMEN SOURCE: NORMAL
T VAGINALIS DNA VAG QL PROBE+SIG AMP: NEGATIVE

## 2023-12-02 RX ORDER — METRONIDAZOLE 500 MG/1
500 TABLET ORAL 2 TIMES DAILY
Qty: 14 TABLET | Refills: 0 | Status: SHIPPED | OUTPATIENT
Start: 2023-12-02 | End: 2023-12-09

## 2023-12-02 NOTE — PROGRESS NOTES
Hospitalist Discharge Summary:  Date of Service: 8/5/2017    Admission Date: 8/4/17  Discharge Date: 8/5/17      Discharge Diagnoses:  1) chest pain, likely noncardiac etiology  2) atherosclerotic coronary disease, history of myocardial infarction    Initial Presentation and Hospital Course:   71-year-old male who had a single episode at home of left-sided chest pain, vomiting, dizzy feeling and weakness.  Symptoms had resolved by the time he got to the hospital and never recurred  Cardiac telemetry monitoring showed no malignant arrhythmias  Cardiac enzymes did not show evidence of acute myocardial necrosis  Cardiac stress testing did not show any reversible ischemia  Gallbladder sonogram was normal    Consultations: None  Complications: None      Discharge Disposition:  By the following day, patient was breathing comfortably on room air, did not have any further episodes of chest pain and will be discharged home in the care of his wife    Followup:  Primary care physician in one week    Discharge Diet: As tolerated  Discharge Activity: As tolerated  Medication changes at discharge: None    Moreno Sow MD  [facility]     Patient called and notified of positive Gardnerella vaginalis.  Prescription sent to preferred pharmacy and counseled on appropriate use of medication.

## 2023-12-18 ENCOUNTER — PATIENT MESSAGE (OUTPATIENT)
Dept: MEDICAL GROUP | Facility: PHYSICIAN GROUP | Age: 38
End: 2023-12-18
Payer: COMMERCIAL

## 2023-12-19 RX ORDER — PANTOPRAZOLE SODIUM 40 MG/1
TABLET, DELAYED RELEASE ORAL
Qty: 90 TABLET | Refills: 3 | Status: SHIPPED | OUTPATIENT
Start: 2023-12-19

## 2023-12-19 RX ORDER — ALBUTEROL SULFATE 90 UG/1
2 AEROSOL, METERED RESPIRATORY (INHALATION) EVERY 6 HOURS PRN
Qty: 1 EACH | Refills: 3 | Status: SHIPPED | OUTPATIENT
Start: 2023-12-19

## 2023-12-23 ENCOUNTER — APPOINTMENT (OUTPATIENT)
Dept: RADIOLOGY | Facility: MEDICAL CENTER | Age: 38
End: 2023-12-23
Attending: ANESTHESIOLOGY
Payer: COMMERCIAL

## 2024-01-03 ENCOUNTER — OFFICE VISIT (OUTPATIENT)
Dept: URGENT CARE | Facility: PHYSICIAN GROUP | Age: 39
End: 2024-01-03
Payer: COMMERCIAL

## 2024-01-03 VITALS
HEART RATE: 77 BPM | SYSTOLIC BLOOD PRESSURE: 110 MMHG | RESPIRATION RATE: 12 BRPM | HEIGHT: 67 IN | BODY MASS INDEX: 26.53 KG/M2 | WEIGHT: 169 LBS | TEMPERATURE: 97.1 F | DIASTOLIC BLOOD PRESSURE: 70 MMHG | OXYGEN SATURATION: 100 %

## 2024-01-03 DIAGNOSIS — Z20.828 EXPOSURE TO RESPIRATORY SYNCYTIAL VIRUS (RSV): ICD-10-CM

## 2024-01-03 DIAGNOSIS — Z20.818 EXPOSURE TO STREP THROAT: ICD-10-CM

## 2024-01-03 LAB
FLUAV RNA SPEC QL NAA+PROBE: NEGATIVE
FLUBV RNA SPEC QL NAA+PROBE: NEGATIVE
RSV RNA SPEC QL NAA+PROBE: NEGATIVE
S PYO DNA SPEC NAA+PROBE: NOT DETECTED
SARS-COV-2 RNA RESP QL NAA+PROBE: NEGATIVE

## 2024-01-03 PROCEDURE — 3078F DIAST BP <80 MM HG: CPT

## 2024-01-03 PROCEDURE — 99213 OFFICE O/P EST LOW 20 MIN: CPT

## 2024-01-03 PROCEDURE — 87651 STREP A DNA AMP PROBE: CPT

## 2024-01-03 PROCEDURE — 3074F SYST BP LT 130 MM HG: CPT

## 2024-01-03 PROCEDURE — 0241U POCT CEPHEID COV-2, FLU A/B, RSV - PCR: CPT

## 2024-01-03 RX ORDER — CEPHALEXIN 500 MG/1
500 CAPSULE ORAL 2 TIMES DAILY
Qty: 20 CAPSULE | Refills: 0 | Status: SHIPPED | OUTPATIENT
Start: 2024-01-03 | End: 2024-01-13

## 2024-01-03 ASSESSMENT — FIBROSIS 4 INDEX: FIB4 SCORE: 0.6

## 2024-01-03 ASSESSMENT — ENCOUNTER SYMPTOMS: COUGH: 1

## 2024-01-03 NOTE — PROGRESS NOTES
Subjective:   Chely Pizarro is a 38 y.o. female who presents for Sore Throat (Pt has a sore throat, cough, fever, drainage x 10 days )      HPI: This is a 38-year-old female who presents today for recent strep throat exposure.  Patient reports that she developed sore throat, fevers, postnasal drip, and cough 10 days ago.  She reports fevers have now resolved.  She continues to experience postnasal drip and cough.  She reports that her son tested positive for strep throat last night and was placed on oral antibiotics.  She has been taking Tylenol and Motrin for symptoms.  Patient also reports recent exposure to RSV and is requesting testing for this.      Review of Systems   HENT:  Positive for congestion.         +post nasal drip   Respiratory:  Positive for cough.        Medications:    Current Outpatient Medications on File Prior to Visit   Medication Sig Dispense Refill    albuterol 108 (90 Base) MCG/ACT Aero Soln inhalation aerosol Inhale 2 Puffs every 6 hours as needed for Shortness of Breath. 1 Each 3    pantoprazole (PROTONIX) 40 MG Tablet Delayed Response TAKE 1 TABLET BY MOUTH ONCE A DAY 30 MINUTES BEFORE BREAKFAST MEAL 90 Tablet 3    Multiple Vitamin (MULTIVITAMIN ADULT PO) Take 1 Tablet by mouth every day.  Vitamin      methylPREDNISolone (MEDROL DOSEPAK) 4 MG Tablet Therapy Pack Follow schedule on package instructions. (Patient not taking: Reported on 2023) 21 Tablet 0    busPIRone (BUSPAR) 5 MG tablet TAKE 1 TABLET BY MOUTH 3 TIMES A DAY. FOR ANXIETY. (Patient not taking: Reported on 2023) 270 Tablet 0    buPROPion (WELLBUTRIN) 75 MG Tab Take 1 a day for 1 week then 1 BID (Patient not taking: Reported on 2023) 60 Tablet 3    ibuprofen (MOTRIN) 600 MG Tab Take 1 Tablet by mouth every 6 hours as needed for Mild Pain or Moderate Pain. (Patient not taking: Reported on 2023) 30 Tablet 1    hydrOXYzine HCl (ATARAX) 25 MG Tab Take 1 Tablet by mouth 3 times a day as needed  "for Anxiety. (Patient not taking: Reported on 12/1/2023) 30 Tablet 1     No current facility-administered medications on file prior to visit.        Allergies:   Pcn [penicillins]    Problem List:   Patient Active Problem List   Diagnosis    Fatigue    Pre-syncope    Anxiety    Chronic bilateral low back pain without sciatica    Mild intermittent asthma with acute exacerbation    Seasonal allergies    Anxiety with flying    Wellness examination        Surgical History:  Past Surgical History:   Procedure Laterality Date    AK LAP,DIAGNOSTIC ABDOMEN Bilateral 3/15/2023    Procedure: LAPAROSCOPIC BILATERAL SALPINGECTOMY, ALL INDICATED PROCEDURES, INTRAUTERINE DEVICE REMOVAL;  Surgeon: Riky Guthrie M.D.;  Location: SURGERY SAME DAY [facility];  Service: Gynecology    INSERTION OR REMOVAL, IUD  3/15/2023    Procedure: REMOVAL, IUD;  Surgeon: Riky Guthrie M.D.;  Location: SURGERY SAME DAY [facility];  Service: Gynecology    GYN SURGERY  2012    labiaplasty       Past Social Hx:   Social History     Tobacco Use    Smoking status: Never    Smokeless tobacco: Never   Vaping Use    Vaping Use: Never used   Substance Use Topics    Alcohol use: Not Currently     Comment: 2 per month    Drug use: No          Problem list, medications, and allergies reviewed by myself today in Epic.     Objective:     /70 (BP Location: Right arm, Patient Position: Standing, BP Cuff Size: Adult)   Pulse 77   Temp 36.2 °C (97.1 °F) (Temporal)   Resp 12   Ht 1.702 m (5' 7\")   Wt 76.7 kg (169 lb)   SpO2 100%   BMI 26.47 kg/m²     Physical Exam  Vitals and nursing note reviewed.   Constitutional:       General: She is not in acute distress.     Appearance: Normal appearance. She is normal weight. She is not ill-appearing, toxic-appearing or diaphoretic.   HENT:      Head: Normocephalic and atraumatic.      Right Ear: Tympanic membrane, ear canal and external ear normal. There is no impacted cerumen.      Left Ear: Tympanic " membrane, ear canal and external ear normal. There is no impacted cerumen.      Nose: Congestion present. No rhinorrhea.      Mouth/Throat:      Mouth: Mucous membranes are moist.      Pharynx: Oropharynx is clear. Uvula midline. Posterior oropharyngeal erythema present. No oropharyngeal exudate.      Tonsils: No tonsillar exudate. 0 on the right. 0 on the left.   Cardiovascular:      Rate and Rhythm: Normal rate and regular rhythm.      Pulses: Normal pulses.      Heart sounds: Normal heart sounds. No murmur heard.     No friction rub. No gallop.   Pulmonary:      Effort: Pulmonary effort is normal. No respiratory distress.      Breath sounds: Normal breath sounds. No stridor. No wheezing, rhonchi or rales.   Chest:      Chest wall: No tenderness.   Musculoskeletal:      Cervical back: Neck supple. No tenderness.   Lymphadenopathy:      Cervical: No cervical adenopathy.   Skin:     General: Skin is warm and dry.      Capillary Refill: Capillary refill takes less than 2 seconds.   Neurological:      General: No focal deficit present.      Mental Status: She is alert and oriented to person, place, and time. Mental status is at baseline.      Cranial Nerves: No cranial nerve deficit.      Motor: No weakness.      Gait: Gait normal.   Psychiatric:         Mood and Affect: Mood normal.         Behavior: Behavior normal.         Thought Content: Thought content normal.         Judgment: Judgment normal.         Assessment/Plan:     Diagnosis and associated orders:   1. Exposure to strep throat  POCT GROUP A STREP, PCR    cephALEXin (KEFLEX) 500 MG Cap      2. Exposure to respiratory syncytial virus (RSV)  POCT CoV-2, Flu A/B, RSV by PCR         Results for orders placed or performed in visit on 01/03/24   POCT GROUP A STREP, PCR   Result Value Ref Range    POC Group A Strep, PCR Not Detected Not Detected, Invalid   POCT CoV-2, Flu A/B, RSV by PCR   Result Value Ref Range    SARS-CoV-2 by PCR Negative Negative, Invalid     Influenza virus A RNA Negative Negative, Invalid    Influenza virus B, PCR Negative Negative, Invalid    RSV, PCR Negative Negative, Invalid       Comments/MDM:   Pt is clinically stable at today's acute urgent care visit.  No acute distress noted. Appropriate for outpatient management at this time.     Acute problem.  Strep, influenza, RSV, and COVID are all negative.  Given her symptoms and recent exposure to strep throat, she will be treated empirically with course of antibiotics.  I asked patient to begin taking and biotic as prescribed, use warm salt water gargles, alternate Tylenol ibuprofen as a for pain and fevers.  She is return for any new or worsening signs or symptoms or symptoms fail to improve.  Patient agreed with this plan of care verbalizes good understanding.           Discussed DDx, management options (risks,benefits, and alternatives to planned treatment), natural progression and supportive care.  Expressed understanding and the treatment plan was agreed upon. Questions were encouraged and answered   Return to urgent care prn if new or worsening sx or if there is no improvement in condition prn.    Educated in Red flags and indications to immediately call 911 or present to the Emergency Department.   Advised the patient to follow-up with the primary care physician for recheck, reevaluation, and consideration of further management.    I personally reviewed prior external notes and test results pertinent to today's visit.  I have independently reviewed and interpreted all diagnostics ordered during this urgent care acute visit.       Please note that this dictation was created using voice recognition software. I have made a reasonable attempt to correct obvious errors, but I expect that there are errors of grammar and possibly content that I did not discover before finalizing the note.    This note was electronically signed by SNEHAL Martinez

## 2024-01-15 ENCOUNTER — OFFICE VISIT (OUTPATIENT)
Dept: URGENT CARE | Facility: PHYSICIAN GROUP | Age: 39
End: 2024-01-15
Payer: COMMERCIAL

## 2024-01-15 ENCOUNTER — HOSPITAL ENCOUNTER (OUTPATIENT)
Facility: MEDICAL CENTER | Age: 39
End: 2024-01-15
Attending: NURSE PRACTITIONER
Payer: COMMERCIAL

## 2024-01-15 VITALS
BODY MASS INDEX: 26.4 KG/M2 | DIASTOLIC BLOOD PRESSURE: 60 MMHG | OXYGEN SATURATION: 96 % | HEIGHT: 67 IN | SYSTOLIC BLOOD PRESSURE: 116 MMHG | HEART RATE: 83 BPM | RESPIRATION RATE: 12 BRPM | TEMPERATURE: 97.7 F | WEIGHT: 168.2 LBS

## 2024-01-15 DIAGNOSIS — N89.8 VAGINAL DISCHARGE: ICD-10-CM

## 2024-01-15 LAB
APPEARANCE UR: CLEAR
BILIRUB UR STRIP-MCNC: NEGATIVE MG/DL
COLOR UR AUTO: NORMAL
GLUCOSE UR STRIP.AUTO-MCNC: NEGATIVE MG/DL
KETONES UR STRIP.AUTO-MCNC: NEGATIVE MG/DL
LEUKOCYTE ESTERASE UR QL STRIP.AUTO: NEGATIVE
NITRITE UR QL STRIP.AUTO: NEGATIVE
PH UR STRIP.AUTO: 5.5 [PH] (ref 5–8)
PROT UR QL STRIP: NEGATIVE MG/DL
RBC UR QL AUTO: NEGATIVE
SP GR UR STRIP.AUTO: 1.03
UROBILINOGEN UR STRIP-MCNC: 0.2 MG/DL

## 2024-01-15 PROCEDURE — 99213 OFFICE O/P EST LOW 20 MIN: CPT | Performed by: NURSE PRACTITIONER

## 2024-01-15 PROCEDURE — 3074F SYST BP LT 130 MM HG: CPT | Performed by: NURSE PRACTITIONER

## 2024-01-15 PROCEDURE — 87510 GARDNER VAG DNA DIR PROBE: CPT

## 2024-01-15 PROCEDURE — 87660 TRICHOMONAS VAGIN DIR PROBE: CPT

## 2024-01-15 PROCEDURE — 87480 CANDIDA DNA DIR PROBE: CPT

## 2024-01-15 PROCEDURE — 3078F DIAST BP <80 MM HG: CPT | Performed by: NURSE PRACTITIONER

## 2024-01-15 PROCEDURE — 81002 URINALYSIS NONAUTO W/O SCOPE: CPT | Performed by: NURSE PRACTITIONER

## 2024-01-15 RX ORDER — METRONIDAZOLE 7.5 MG/G
1 GEL TOPICAL
Qty: 45 G | Refills: 0 | Status: SHIPPED | OUTPATIENT
Start: 2024-01-15 | End: 2024-01-20

## 2024-01-15 ASSESSMENT — ENCOUNTER SYMPTOMS
MUSCULOSKELETAL NEGATIVE: 1
GASTROINTESTINAL NEGATIVE: 1
CHILLS: 0
CONSTITUTIONAL NEGATIVE: 1
CARDIOVASCULAR NEGATIVE: 1
NEUROLOGICAL NEGATIVE: 1
FEVER: 0
RESPIRATORY NEGATIVE: 1
EYES NEGATIVE: 1

## 2024-01-15 ASSESSMENT — FIBROSIS 4 INDEX: FIB4 SCORE: 0.6

## 2024-01-15 NOTE — PROGRESS NOTES
"Subjective:   Chely Pizarro is a 38 y.o. female who presents for Other (Pt has vaginal odor x yesterday )      Patient presents for evaluation of vaginal odor and discharge which began yesterday.  She reports thin discharge which is malodorous.  She states she did just take antibiotics for strep throat exposure and just completed them.  She states she has had both yeast infection and did have BV recently for which she took metronidazole. She reports that her symptoms did resolve.  She states she did use boric acid yesterday for her symptoms which do feel improved today.  She denies any fever or chills. No new sexual contacts.         Review of Systems   Constitutional: Negative.  Negative for chills and fever.   HENT: Negative.     Eyes: Negative.    Respiratory: Negative.     Cardiovascular: Negative.    Gastrointestinal: Negative.    Genitourinary:         Vaginal discharge and odor   Musculoskeletal: Negative.    Skin: Negative.    Neurological: Negative.        Medications, Allergies, and current problem list reviewed today in Epic.     Objective:     /60 (BP Location: Left arm, Patient Position: Sitting, BP Cuff Size: Adult)   Pulse 83   Temp 36.5 °C (97.7 °F) (Temporal)   Resp 12   Ht 1.702 m (5' 7\")   Wt 76.3 kg (168 lb 3.2 oz)   SpO2 96%     Physical Exam  Vitals reviewed.   Constitutional:       Appearance: Normal appearance.   HENT:      Head: Normocephalic and atraumatic.      Nose: Nose normal.      Mouth/Throat:      Mouth: Mucous membranes are moist.      Pharynx: Oropharynx is clear.   Eyes:      Extraocular Movements: Extraocular movements intact.      Conjunctiva/sclera: Conjunctivae normal.      Pupils: Pupils are equal, round, and reactive to light.   Cardiovascular:      Rate and Rhythm: Normal rate and regular rhythm.      Pulses: Normal pulses.      Heart sounds: Normal heart sounds.   Pulmonary:      Effort: Pulmonary effort is normal.      Breath sounds: Normal breath " sounds.   Abdominal:      General: Abdomen is flat.      Palpations: Abdomen is soft.   Genitourinary:     Comments: Self swab taken  Musculoskeletal:         General: Normal range of motion.      Cervical back: Normal range of motion and neck supple.   Skin:     General: Skin is warm and dry.      Capillary Refill: Capillary refill takes less than 2 seconds.   Neurological:      General: No focal deficit present.      Mental Status: She is alert.   Psychiatric:         Mood and Affect: Mood normal.         Behavior: Behavior normal.       Results for orders placed or performed in visit on 01/15/24   POCT Urinalysis   Result Value Ref Range    POC Color Dark Yellow Negative    POC Appearance Clear Negative    POC Glucose Negative Negative mg/dL    POC Bilirubin Negative Negative mg/dL    POC Ketones Negative Negative mg/dL    POC Specific Gravity 1.030 <1.005 - >1.030    POC Blood Negative Negative    POC Urine PH 5.5 5.0 - 8.0    POC Protein Negative Negative mg/dL    POC Urobiligen 0.2 Negative (0.2) mg/dL    POC Nitrites Negative Negative    POC Leukocyte Esterase Negative Negative       Assessment/Plan:     Diagnosis and associated orders:     1. Vaginal discharge  POCT Urinalysis    VAGINAL PATHOGENS DNA PANEL    metronidazole (METROGEL) 0.75 % gel         Comments/MDM:     Patient will be treated empirically for bacterial vaginosis based on her history and clinical symptoms.  She will be notified of the results of her vaginal pathogen sample when available.  Any further treatment recommendations will be made at that time. Patient is in agreement with this plan.  She prefers not to take any further oral antibiotics if avoidable.          Differential diagnosis, natural history, supportive care, and indications for immediate follow-up discussed.    Advised the patient to follow-up with the primary care physician for recheck, reevaluation, and consideration of further management.    Please note that this  dictation was created using voice recognition software. I have made a reasonable attempt to correct obvious errors, but I expect that there are errors of grammar and possibly content that I did not discover before finalizing the note.    This note was electronically signed by SNEHAL Dailey

## 2024-01-16 LAB
CANDIDA DNA VAG QL PROBE+SIG AMP: NEGATIVE
G VAGINALIS DNA VAG QL PROBE+SIG AMP: NEGATIVE
T VAGINALIS DNA VAG QL PROBE+SIG AMP: NEGATIVE

## 2024-01-19 ENCOUNTER — APPOINTMENT (OUTPATIENT)
Dept: MEDICAL GROUP | Facility: PHYSICIAN GROUP | Age: 39
End: 2024-01-19
Payer: COMMERCIAL

## 2024-02-09 ENCOUNTER — APPOINTMENT (OUTPATIENT)
Dept: MEDICAL GROUP | Facility: PHYSICIAN GROUP | Age: 39
End: 2024-02-09
Payer: COMMERCIAL

## 2024-02-13 ENCOUNTER — OFFICE VISIT (OUTPATIENT)
Dept: URGENT CARE | Facility: PHYSICIAN GROUP | Age: 39
End: 2024-02-13
Payer: COMMERCIAL

## 2024-02-13 ENCOUNTER — OFFICE VISIT (OUTPATIENT)
Dept: MEDICAL GROUP | Facility: PHYSICIAN GROUP | Age: 39
End: 2024-02-13
Payer: COMMERCIAL

## 2024-02-13 VITALS
HEIGHT: 67 IN | WEIGHT: 169.6 LBS | HEART RATE: 73 BPM | TEMPERATURE: 97.5 F | BODY MASS INDEX: 26.62 KG/M2 | RESPIRATION RATE: 16 BRPM | DIASTOLIC BLOOD PRESSURE: 74 MMHG | SYSTOLIC BLOOD PRESSURE: 122 MMHG | OXYGEN SATURATION: 99 %

## 2024-02-13 VITALS
OXYGEN SATURATION: 98 % | TEMPERATURE: 97.5 F | RESPIRATION RATE: 16 BRPM | SYSTOLIC BLOOD PRESSURE: 122 MMHG | BODY MASS INDEX: 26.56 KG/M2 | WEIGHT: 169.2 LBS | HEIGHT: 67 IN | DIASTOLIC BLOOD PRESSURE: 74 MMHG | HEART RATE: 77 BPM

## 2024-02-13 DIAGNOSIS — F41.9 ANXIETY: ICD-10-CM

## 2024-02-13 DIAGNOSIS — M54.2 CHRONIC NECK PAIN: ICD-10-CM

## 2024-02-13 DIAGNOSIS — Z20.818 EXPOSURE TO STREP THROAT: ICD-10-CM

## 2024-02-13 DIAGNOSIS — G89.29 CHRONIC NECK PAIN: ICD-10-CM

## 2024-02-13 DIAGNOSIS — J02.9 PHARYNGITIS, UNSPECIFIED ETIOLOGY: ICD-10-CM

## 2024-02-13 LAB — S PYO DNA SPEC NAA+PROBE: NOT DETECTED

## 2024-02-13 PROCEDURE — 3074F SYST BP LT 130 MM HG: CPT

## 2024-02-13 PROCEDURE — 99213 OFFICE O/P EST LOW 20 MIN: CPT

## 2024-02-13 PROCEDURE — 99213 OFFICE O/P EST LOW 20 MIN: CPT | Performed by: PHYSICIAN ASSISTANT

## 2024-02-13 PROCEDURE — 3074F SYST BP LT 130 MM HG: CPT | Performed by: PHYSICIAN ASSISTANT

## 2024-02-13 PROCEDURE — 3078F DIAST BP <80 MM HG: CPT | Performed by: PHYSICIAN ASSISTANT

## 2024-02-13 PROCEDURE — 87651 STREP A DNA AMP PROBE: CPT

## 2024-02-13 PROCEDURE — 3078F DIAST BP <80 MM HG: CPT

## 2024-02-13 ASSESSMENT — ENCOUNTER SYMPTOMS
SHORTNESS OF BREATH: 0
NECK PAIN: 1
WEAKNESS: 0
DIZZINESS: 0
SHORTNESS OF BREATH: 0
SINUS PAIN: 0
MYALGIAS: 0
NAUSEA: 0
ABDOMINAL PAIN: 0
CHILLS: 0
COUGH: 0
VOMITING: 0
FEVER: 0
SPUTUM PRODUCTION: 0
FEVER: 0
STRIDOR: 0
HEADACHES: 0
NECK PAIN: 0
SORE THROAT: 1
DIARRHEA: 0
CHILLS: 0
WHEEZING: 0

## 2024-02-13 ASSESSMENT — FIBROSIS 4 INDEX
FIB4 SCORE: 0.6
FIB4 SCORE: 0.6

## 2024-02-13 NOTE — PROGRESS NOTES
Subjective     Chely Pizarro is a 38 y.o. female who presents for strep throat exposure.     HPI:   Chely is a 39yo female presenting today for recent strep throat exposure. Patient reports her son is being treated with antibiotics for strep pharyngitis. Reports chronic sore throat related to GERD. Denies any abdominal pain, vomiting, or diarrhea. No fever. Denies cough or shortness of breath. She is eating and drinking normally. Reports no symptoms or concerns.       Review of Systems   Constitutional:  Negative for chills, fever and malaise/fatigue.   HENT:  Positive for sore throat. Negative for congestion, ear discharge, ear pain and sinus pain.    Respiratory:  Negative for cough, sputum production, shortness of breath, wheezing and stridor.    Gastrointestinal:  Negative for abdominal pain, diarrhea, nausea and vomiting.   Musculoskeletal:  Negative for myalgias and neck pain.   Skin:  Negative for rash.   Neurological:  Negative for dizziness, weakness and headaches.     Past Medical History:   Diagnosis Date    Acid reflux 03/07/2023    Chronic bilateral low back pain without sciatica 05/10/2022    Indigestion 03/07/2023    Mild intermittent asthma with acute exacerbation 10/20/2022    Psychiatric problem 03/07/2023    anxiety      Past Surgical History:   Procedure Laterality Date    AL LAP,DIAGNOSTIC ABDOMEN Bilateral 3/15/2023    Procedure: LAPAROSCOPIC BILATERAL SALPINGECTOMY, ALL INDICATED PROCEDURES, INTRAUTERINE DEVICE REMOVAL;  Surgeon: Riky Guthrie M.D.;  Location: SURGERY SAME DAY [facility];  Service: Gynecology    INSERTION OR REMOVAL, IUD  3/15/2023    Procedure: REMOVAL, IUD;  Surgeon: Riky Guthrie M.D.;  Location: SURGERY SAME DAY [facility];  Service: Gynecology    GYN SURGERY  2012    labiaplasty      Allergies: Pcn [penicillins]     Current Outpatient Medications:     albuterol 108 (90 Base) MCG/ACT Aero Soln inhalation aerosol, Inhale 2 Puffs every 6 hours as needed for  "Shortness of Breath., Disp: 1 Each, Rfl: 3    pantoprazole (PROTONIX) 40 MG Tablet Delayed Response, TAKE 1 TABLET BY MOUTH ONCE A DAY 30 MINUTES BEFORE BREAKFAST MEAL, Disp: 90 Tablet, Rfl: 3    Multiple Vitamin (MULTIVITAMIN ADULT PO), Take 1 Tablet by mouth every day.  Vitamin, Disp: , Rfl:      Social History     Tobacco Use    Smoking status: Never    Smokeless tobacco: Never   Vaping Use    Vaping Use: Never used   Substance Use Topics    Alcohol use: Yes     Comment: 2 per month    Drug use: No      History reviewed. No pertinent family history.     Medications, Allergies, and current problem list reviewed today in Epic.      Objective     /74   Pulse 77   Temp 36.4 °C (97.5 °F) (Temporal)   Resp 16   Ht 1.702 m (5' 7\")   Wt 76.7 kg (169 lb 3.2 oz)   LMP 2024   SpO2 98%   BMI 26.50 kg/m²      Physical Exam  Vitals reviewed.   Constitutional:       General: She is not in acute distress.  HENT:      Right Ear: Tympanic membrane, ear canal and external ear normal.      Left Ear: Tympanic membrane, ear canal and external ear normal.      Nose: Nose normal.      Mouth/Throat:      Mouth: Mucous membranes are moist.      Pharynx: Uvula midline. Posterior oropharyngeal erythema present. No oropharyngeal exudate.      Tonsils: No tonsillar exudate or tonsillar abscesses.   Eyes:      Extraocular Movements: Extraocular movements intact.      Conjunctiva/sclera: Conjunctivae normal.      Pupils: Pupils are equal, round, and reactive to light.   Cardiovascular:      Rate and Rhythm: Normal rate and regular rhythm.      Pulses: Normal pulses.      Heart sounds: Normal heart sounds.   Pulmonary:      Effort: Pulmonary effort is normal. No tachypnea, accessory muscle usage, prolonged expiration or respiratory distress.      Breath sounds: Normal breath sounds. No stridor. No wheezing, rhonchi or rales.   Musculoskeletal:      Cervical back: Full passive range of motion without pain, normal " range of motion and neck supple. No rigidity or tenderness.   Lymphadenopathy:      Cervical: No cervical adenopathy.   Skin:     General: Skin is warm and dry.   Neurological:      Mental Status: She is alert. Mental status is at baseline.   Psychiatric:         Mood and Affect: Mood normal.         Behavior: Behavior normal.         Thought Content: Thought content normal.       Results for orders placed or performed in visit on 02/13/24   POCT CEPHEID GROUP A STREP - PCR   Result Value Ref Range    POC Group A Strep, PCR Not Detected Not Detected, Invalid     Assessment & Plan     1. Pharyngitis, unspecified etiology   - POCT CEPHEID GROUP A STREP - PCR    2. Exposure to strep throat      MDM/Comments:   Patient with close exposure to strep throat requesting testing. No current symptoms at this time.   - POCT Cepheid Group A Strep- negative   No sign of bacterial infection on exam.      Differential diagnosis, natural history, supportive care, and indications for immediate follow-up discussed.      Follow-up as needed if symptoms worsen or fail to improve to PCP, Urgent care or Emergency Room.                 Electronically signed by SNEHAL Patterson

## 2024-02-13 NOTE — PROGRESS NOTES
"Subjective:     CC:     HPI:   Chely, patient of Dr. Willingham, presents today with the following:      Assessment & Plan by Problem:       Problem List Items Addressed This Visit       Anxiety     Chronic, uncontrolled.  Currently feeling quite anxious over neck pain.  Very nervous about what could be happening, why she keeps getting it.           Chronic neck pain     Chronic, intermittent, currently uncontrolled.  Recommend starting PT.  Discussed with patient that it is reassuring that physical therapy does improve her symptoms, even though they do keep coming back.  She has 2 young children and likely does a lot of lifting.  Advised her that the PT exercises are something she may need to do to maintain control over her neck pain.  Recommend PT and possibly acupuncture if pain persist.  Otherwise recommend following up with [facility]            Return if symptoms worsen or fail to improve.               ROS:  Review of Systems   Constitutional:  Negative for chills and fever.   Respiratory:  Negative for shortness of breath.    Cardiovascular:  Negative for chest pain.   Musculoskeletal:  Positive for neck pain.       Objective:     Exam:  /74 (BP Location: Left arm, Patient Position: Sitting, BP Cuff Size: Adult)   Pulse 73   Temp 36.4 °C (97.5 °F) (Temporal)   Resp 16   Ht 1.702 m (5' 7\")   Wt 76.9 kg (169 lb 9.6 oz)   LMP 01/20/2024   SpO2 99%   Breastfeeding No   BMI 26.56 kg/m²  Body mass index is 26.56 kg/m².    Physical Exam  Vitals reviewed.   Constitutional:       General: She is not in acute distress.     Appearance: Normal appearance.   Neck:      Comments: Patient does have full range of motion of her neck but reports pain in the posterior left side radiating down to the left arm with range of motion.  Pulmonary:      Effort: Pulmonary effort is normal.   Neurological:      General: No focal deficit present.      Mental Status: She is alert.   Psychiatric:         Mood and Affect: Mood " normal.         Behavior: Behavior normal.         Judgment: Judgment normal.                                Please note that this dictation was created using voice recognition software. I have made every reasonable attempt to correct obvious errors, but I expect that there are errors of grammar and possibly content that I did not discover before finalizing the note.

## 2024-02-13 NOTE — ASSESSMENT & PLAN NOTE
Chronic, intermittent, currently uncontrolled.  Recommend starting PT.  Discussed with patient that it is reassuring that physical therapy does improve her symptoms, even though they do keep coming back.  She has 2 young children and likely does a lot of lifting.  Advised her that the PT exercises are something she may need to do to maintain control over her neck pain.  Recommend PT and possibly acupuncture if pain persist.  Otherwise recommend following up with [facility]

## 2024-05-31 ENCOUNTER — APPOINTMENT (OUTPATIENT)
Dept: MEDICAL GROUP | Facility: PHYSICIAN GROUP | Age: 39
End: 2024-05-31
Payer: COMMERCIAL

## 2024-07-01 ENCOUNTER — OFFICE VISIT (OUTPATIENT)
Dept: URGENT CARE | Facility: PHYSICIAN GROUP | Age: 39
End: 2024-07-01
Payer: COMMERCIAL

## 2024-07-01 VITALS
HEIGHT: 67 IN | DIASTOLIC BLOOD PRESSURE: 68 MMHG | RESPIRATION RATE: 20 BRPM | BODY MASS INDEX: 25.11 KG/M2 | OXYGEN SATURATION: 93 % | WEIGHT: 160 LBS | TEMPERATURE: 97.8 F | HEART RATE: 71 BPM | SYSTOLIC BLOOD PRESSURE: 104 MMHG

## 2024-07-01 DIAGNOSIS — J02.0 PHARYNGITIS DUE TO GROUP A BETA HEMOLYTIC STREPTOCOCCI: ICD-10-CM

## 2024-07-01 LAB
FLUAV RNA SPEC QL NAA+PROBE: NEGATIVE
FLUBV RNA SPEC QL NAA+PROBE: NEGATIVE
RSV RNA SPEC QL NAA+PROBE: NEGATIVE
S PYO DNA SPEC NAA+PROBE: DETECTED
SARS-COV-2 RNA RESP QL NAA+PROBE: NEGATIVE

## 2024-07-01 PROCEDURE — 99213 OFFICE O/P EST LOW 20 MIN: CPT | Performed by: STUDENT IN AN ORGANIZED HEALTH CARE EDUCATION/TRAINING PROGRAM

## 2024-07-01 PROCEDURE — 3074F SYST BP LT 130 MM HG: CPT | Performed by: STUDENT IN AN ORGANIZED HEALTH CARE EDUCATION/TRAINING PROGRAM

## 2024-07-01 PROCEDURE — 87651 STREP A DNA AMP PROBE: CPT | Performed by: STUDENT IN AN ORGANIZED HEALTH CARE EDUCATION/TRAINING PROGRAM

## 2024-07-01 PROCEDURE — 3078F DIAST BP <80 MM HG: CPT | Performed by: STUDENT IN AN ORGANIZED HEALTH CARE EDUCATION/TRAINING PROGRAM

## 2024-07-01 PROCEDURE — 0241U POCT CEPHEID COV-2, FLU A/B, RSV - PCR: CPT | Performed by: STUDENT IN AN ORGANIZED HEALTH CARE EDUCATION/TRAINING PROGRAM

## 2024-07-01 RX ORDER — CEPHALEXIN 500 MG/1
500 CAPSULE ORAL 2 TIMES DAILY
Qty: 20 CAPSULE | Refills: 0 | Status: SHIPPED | OUTPATIENT
Start: 2024-07-01 | End: 2024-07-11

## 2024-07-01 ASSESSMENT — FIBROSIS 4 INDEX: FIB4 SCORE: 0.6

## 2024-07-24 ENCOUNTER — APPOINTMENT (OUTPATIENT)
Dept: RADIOLOGY | Facility: MEDICAL CENTER | Age: 39
End: 2024-07-24
Attending: ANESTHESIOLOGY
Payer: COMMERCIAL

## 2024-07-24 ENCOUNTER — OFFICE VISIT (OUTPATIENT)
Dept: URGENT CARE | Facility: PHYSICIAN GROUP | Age: 39
End: 2024-07-24
Payer: COMMERCIAL

## 2024-07-24 VITALS
RESPIRATION RATE: 16 BRPM | DIASTOLIC BLOOD PRESSURE: 72 MMHG | HEART RATE: 73 BPM | HEIGHT: 67 IN | BODY MASS INDEX: 24.88 KG/M2 | OXYGEN SATURATION: 100 % | WEIGHT: 158.51 LBS | SYSTOLIC BLOOD PRESSURE: 110 MMHG | TEMPERATURE: 98.2 F

## 2024-07-24 DIAGNOSIS — N30.01 ACUTE CYSTITIS WITH HEMATURIA: ICD-10-CM

## 2024-07-24 DIAGNOSIS — T14.8XXA HEMATOMA: ICD-10-CM

## 2024-07-24 LAB
APPEARANCE UR: ABNORMAL
BILIRUB UR STRIP-MCNC: NEGATIVE MG/DL
COLOR UR AUTO: YELLOW
GLUCOSE UR STRIP.AUTO-MCNC: NEGATIVE MG/DL
KETONES UR STRIP.AUTO-MCNC: NEGATIVE MG/DL
LEUKOCYTE ESTERASE UR QL STRIP.AUTO: ABNORMAL
NITRITE UR QL STRIP.AUTO: NEGATIVE
PH UR STRIP.AUTO: 8.5 [PH] (ref 5–8)
PROT UR QL STRIP: NEGATIVE MG/DL
RBC UR QL AUTO: ABNORMAL
SP GR UR STRIP.AUTO: 1.01
UROBILINOGEN UR STRIP-MCNC: 0.2 MG/DL

## 2024-07-24 PROCEDURE — 81002 URINALYSIS NONAUTO W/O SCOPE: CPT | Performed by: REGISTERED NURSE

## 2024-07-24 PROCEDURE — 99214 OFFICE O/P EST MOD 30 MIN: CPT | Performed by: REGISTERED NURSE

## 2024-07-24 PROCEDURE — 3078F DIAST BP <80 MM HG: CPT | Performed by: REGISTERED NURSE

## 2024-07-24 PROCEDURE — 3074F SYST BP LT 130 MM HG: CPT | Performed by: REGISTERED NURSE

## 2024-07-24 RX ORDER — NITROFURANTOIN 25; 75 MG/1; MG/1
100 CAPSULE ORAL 2 TIMES DAILY
Qty: 10 CAPSULE | Refills: 0 | Status: SHIPPED | OUTPATIENT
Start: 2024-07-24 | End: 2024-07-29

## 2024-07-24 ASSESSMENT — ENCOUNTER SYMPTOMS
NAUSEA: 0
FEVER: 0
SHORTNESS OF BREATH: 0
CHILLS: 0
FLANK PAIN: 0
ABDOMINAL PAIN: 0
DIZZINESS: 0
VOMITING: 0

## 2024-07-24 ASSESSMENT — FIBROSIS 4 INDEX: FIB4 SCORE: 0.6

## 2024-09-11 ENCOUNTER — OFFICE VISIT (OUTPATIENT)
Dept: MEDICAL GROUP | Facility: PHYSICIAN GROUP | Age: 39
End: 2024-09-11
Payer: COMMERCIAL

## 2024-09-11 VITALS
DIASTOLIC BLOOD PRESSURE: 76 MMHG | HEART RATE: 82 BPM | TEMPERATURE: 98.4 F | WEIGHT: 160.1 LBS | BODY MASS INDEX: 25.13 KG/M2 | OXYGEN SATURATION: 99 % | HEIGHT: 67 IN | RESPIRATION RATE: 16 BRPM | SYSTOLIC BLOOD PRESSURE: 124 MMHG

## 2024-09-11 DIAGNOSIS — L95.9 VASCULITIS LIMITED TO THE SKIN, UNSPECIFIED: ICD-10-CM

## 2024-09-11 DIAGNOSIS — M67.40 GANGLION CYST: ICD-10-CM

## 2024-09-11 DIAGNOSIS — F41.9 ANXIETY: ICD-10-CM

## 2024-09-11 PROCEDURE — 3074F SYST BP LT 130 MM HG: CPT | Performed by: FAMILY MEDICINE

## 2024-09-11 PROCEDURE — 99213 OFFICE O/P EST LOW 20 MIN: CPT | Performed by: FAMILY MEDICINE

## 2024-09-11 PROCEDURE — 3078F DIAST BP <80 MM HG: CPT | Performed by: FAMILY MEDICINE

## 2024-09-11 RX ORDER — ATENOLOL 25 MG/1
TABLET ORAL
Qty: 60 TABLET | Refills: 1 | Status: SHIPPED | OUTPATIENT
Start: 2024-09-11

## 2024-09-11 ASSESSMENT — FIBROSIS 4 INDEX: FIB4 SCORE: 0.6

## 2024-09-11 ASSESSMENT — PATIENT HEALTH QUESTIONNAIRE - PHQ9: CLINICAL INTERPRETATION OF PHQ2 SCORE: 0

## 2024-09-11 NOTE — ASSESSMENT & PLAN NOTE
This is a chronic problem.  Patient still has troubles with anxiety particularly when getting on the freeway with her  to drive out of town.  She gets anxious the night before just thinking about it.  She is tried various things in the past.  Alprazolam was beneficial but I told her would like to try something that is not as risky for habit-forming.  She is tried buspirone and Atarax but did not like those.

## 2024-09-11 NOTE — PROGRESS NOTES
Subjective:     CC: Here for several issues.    HPI:   Chely presents today with the following medical concerns:    Anxiety  This is a chronic problem.  Patient still has troubles with anxiety particularly when getting on the freeway with her  to drive out of town.  She gets anxious the night before just thinking about it.  She is tried various things in the past.  Alprazolam was beneficial but I told her would like to try something that is not as risky for habit-forming.  She is tried buspirone and Atarax but did not like those.    Vasculitis limited to the skin, unspecified  This is a chronic problem.  Patient was told in the past that she has troubles with a vasculitis that she has troubles with her feet turning blue in the wintertime only after she had children that went away.  But she does get problems with her fingers turning different colors when they get cold consistent with Raynaud's.  She does not really want to be on a medication for it.  Recently she had what feel like a sharp bee sting in her left wrist and then later in her ring finger that turned out to be a burst blood vessel.  No particular reason.    Ganglion cyst  This is a new problem.  Patient is noted to swelling to the back of her left hand.  No trauma.    Past Medical History:   Diagnosis Date    Acid reflux 03/07/2023    Chronic bilateral low back pain without sciatica 05/10/2022    Indigestion 03/07/2023    Mild intermittent asthma with acute exacerbation 10/20/2022    Psychiatric problem 03/07/2023    anxiety       Social History     Tobacco Use    Smoking status: Never    Smokeless tobacco: Never   Vaping Use    Vaping status: Never Used   Substance Use Topics    Alcohol use: Yes     Comment: 2 per month    Drug use: No       Current Outpatient Medications Ordered in Epic   Medication Sig Dispense Refill    Probiotic Product (PROBIOTIC DAILY PO) Take  by mouth.      atenolol (TENORMIN) 25 MG Tab Take 1-2 po BID prn anxiety 60  "Tablet 1    albuterol 108 (90 Base) MCG/ACT Aero Soln inhalation aerosol Inhale 2 Puffs every 6 hours as needed for Shortness of Breath. 1 Each 3    pantoprazole (PROTONIX) 40 MG Tablet Delayed Response TAKE 1 TABLET BY MOUTH ONCE A DAY 30 MINUTES BEFORE BREAKFAST MEAL 90 Tablet 3    Multiple Vitamin (MULTIVITAMIN ADULT PO) Take 1 Tablet by mouth every day.  Vitamin       No current Epic-ordered facility-administered medications on file.       Allergies:  Pcn [penicillins]    Health Maintenance: Completed    ROS:  Gen: no fevers/chills, no changes in weight  Eyes: no changes in vision  ENT: no sore throat, no hearing loss, no bloody nose  Pulm: no sob, no cough  CV: no chest pain, no palpitations  GI: no nausea/vomiting, no diarrhea  : no dysuria  MSk: no myalgias  Skin: no rash  Neuro: no headaches, no numbness/tingling  Heme/Lymph: no easy bruising      Objective:       Exam:  /76 (BP Location: Left arm, Patient Position: Sitting, BP Cuff Size: Adult)   Pulse 82   Temp 36.9 °C (98.4 °F) (Temporal)   Resp 16   Ht 1.702 m (5' 7\")   Wt 72.6 kg (160 lb 1.6 oz)   SpO2 99%   BMI 25.08 kg/m²  Body mass index is 25.08 kg/m².    Gen: Alert and oriented, No apparent distress.  Lungs: Normal effort,   Ext: No clubbing, cyanosis, edema.  There is a small apparent ganglion cyst to the back of the left hand.  Not tender to palpation.  Vascular: Patient has good pulses to her hands.  No abnormal coloration seen in her fingers at the present time.  Some residual bruising seen in the ring finger left hand.  Psych: Patient is alert cooperative.  No unusual thought Mathieu expressed.  Insight and judgment is good.  She does appear to get little anxious when talk about how she feels when driving on the freeway or going on long trips.        Assessment & Plan:     38 y.o. female with the following -     1. Anxiety  This is a chronic problem.  She is talking to a counselor now.  Will have her continue that.  She " does not really want to be on a daily medication but I told her that still an option in the future.  For now we can give her atenolol to try before her trips to see if that is beneficial.  If not we may have to resort to the alprazolam.    2. Vasculitis limited to the skin, unspecified  This is a likely chronic problem.  I told her some of her symptoms are consistent with Raynaud's phenomenon and she may have a variant of it that is causing these other issues.  For now she does not need to do anything about it unless it progresses.  She does not want to take a daily medicine.    3. Ganglion cyst  This is a new issue.  I told her that she most likely does have a ganglion cyst.  If it becomes symptomatic we can refer her to have it removed.      Return if symptoms worsen or fail to improve.    Please note that this dictation was created using voice recognition software. I have made every reasonable attempt to correct obvious errors, but I expect that there are errors of grammar and possibly content that I did not discover before finalizing the note.

## 2024-09-11 NOTE — ASSESSMENT & PLAN NOTE
This is a chronic problem.  Patient was told in the past that she has troubles with a vasculitis that she has troubles with her feet turning blue in the wintertime only after she had children that went away.  But she does get problems with her fingers turning different colors when they get cold consistent with Raynaud's.  She does not really want to be on a medication for it.  Recently she had what feel like a sharp bee sting in her left wrist and then later in her ring finger that turned out to be a burst blood vessel.  No particular reason.

## 2024-10-02 ENCOUNTER — OFFICE VISIT (OUTPATIENT)
Dept: URGENT CARE | Facility: PHYSICIAN GROUP | Age: 39
End: 2024-10-02
Payer: COMMERCIAL

## 2024-10-02 VITALS
BODY MASS INDEX: 25.11 KG/M2 | OXYGEN SATURATION: 98 % | TEMPERATURE: 98.5 F | RESPIRATION RATE: 17 BRPM | WEIGHT: 160 LBS | HEIGHT: 67 IN | HEART RATE: 78 BPM | DIASTOLIC BLOOD PRESSURE: 66 MMHG | SYSTOLIC BLOOD PRESSURE: 102 MMHG

## 2024-10-02 DIAGNOSIS — Z20.818 EXPOSURE TO STREP THROAT: ICD-10-CM

## 2024-10-02 LAB — S PYO DNA SPEC NAA+PROBE: NOT DETECTED

## 2024-10-02 PROCEDURE — 3078F DIAST BP <80 MM HG: CPT

## 2024-10-02 PROCEDURE — 3074F SYST BP LT 130 MM HG: CPT

## 2024-10-02 PROCEDURE — 99213 OFFICE O/P EST LOW 20 MIN: CPT

## 2024-10-02 PROCEDURE — 87651 STREP A DNA AMP PROBE: CPT

## 2024-10-02 ASSESSMENT — ENCOUNTER SYMPTOMS
SORE THROAT: 1
NAUSEA: 0
VOMITING: 0
HEADACHES: 0
CHILLS: 0
SHORTNESS OF BREATH: 0
COUGH: 0
ABDOMINAL PAIN: 0
FEVER: 0
DIARRHEA: 0
MYALGIAS: 0

## 2024-10-02 ASSESSMENT — FIBROSIS 4 INDEX: FIB4 SCORE: 0.6

## 2024-10-10 ENCOUNTER — OFFICE VISIT (OUTPATIENT)
Dept: MEDICAL GROUP | Facility: PHYSICIAN GROUP | Age: 39
End: 2024-10-10
Payer: COMMERCIAL

## 2024-10-10 VITALS
DIASTOLIC BLOOD PRESSURE: 70 MMHG | BODY MASS INDEX: 25.27 KG/M2 | TEMPERATURE: 97.9 F | SYSTOLIC BLOOD PRESSURE: 112 MMHG | HEIGHT: 67 IN | HEART RATE: 80 BPM | WEIGHT: 161 LBS | RESPIRATION RATE: 16 BRPM | OXYGEN SATURATION: 96 %

## 2024-10-10 DIAGNOSIS — J02.9 SORE THROAT: ICD-10-CM

## 2024-10-10 PROBLEM — Z00.00 WELLNESS EXAMINATION: Status: RESOLVED | Noted: 2023-08-02 | Resolved: 2024-10-10

## 2024-10-10 PROCEDURE — 3074F SYST BP LT 130 MM HG: CPT | Performed by: FAMILY MEDICINE

## 2024-10-10 PROCEDURE — 3078F DIAST BP <80 MM HG: CPT | Performed by: FAMILY MEDICINE

## 2024-10-10 PROCEDURE — 99213 OFFICE O/P EST LOW 20 MIN: CPT | Performed by: FAMILY MEDICINE

## 2024-10-10 ASSESSMENT — FIBROSIS 4 INDEX: FIB4 SCORE: 0.6

## 2024-10-19 ENCOUNTER — OFFICE VISIT (OUTPATIENT)
Dept: URGENT CARE | Facility: PHYSICIAN GROUP | Age: 39
End: 2024-10-19
Payer: COMMERCIAL

## 2024-10-19 ENCOUNTER — HOSPITAL ENCOUNTER (OUTPATIENT)
Facility: MEDICAL CENTER | Age: 39
End: 2024-10-19
Payer: COMMERCIAL

## 2024-10-19 VITALS
SYSTOLIC BLOOD PRESSURE: 118 MMHG | BODY MASS INDEX: 25.11 KG/M2 | DIASTOLIC BLOOD PRESSURE: 70 MMHG | WEIGHT: 160 LBS | TEMPERATURE: 98 F | HEART RATE: 71 BPM | HEIGHT: 67 IN | RESPIRATION RATE: 16 BRPM | OXYGEN SATURATION: 97 %

## 2024-10-19 DIAGNOSIS — Z20.818 STREPTOCOCCUS EXPOSURE: ICD-10-CM

## 2024-10-19 LAB — S PYO DNA SPEC NAA+PROBE: NOT DETECTED

## 2024-10-19 PROCEDURE — 3074F SYST BP LT 130 MM HG: CPT

## 2024-10-19 PROCEDURE — 87070 CULTURE OTHR SPECIMN AEROBIC: CPT

## 2024-10-19 PROCEDURE — 87651 STREP A DNA AMP PROBE: CPT

## 2024-10-19 PROCEDURE — 3078F DIAST BP <80 MM HG: CPT

## 2024-10-19 PROCEDURE — 99213 OFFICE O/P EST LOW 20 MIN: CPT

## 2024-10-19 ASSESSMENT — FIBROSIS 4 INDEX: FIB4 SCORE: 0.6

## 2024-10-21 LAB
BACTERIA SPEC RESP CULT: NORMAL
SIGNIFICANT IND 70042: NORMAL
SITE SITE: NORMAL
SOURCE SOURCE: NORMAL

## 2024-11-14 ENCOUNTER — HOSPITAL ENCOUNTER (OUTPATIENT)
Dept: RADIOLOGY | Facility: MEDICAL CENTER | Age: 39
End: 2024-11-14
Attending: ANESTHESIOLOGY
Payer: COMMERCIAL

## 2024-11-14 DIAGNOSIS — M54.12 BRACHIAL NEURITIS: ICD-10-CM

## 2024-11-14 PROCEDURE — 72141 MRI NECK SPINE W/O DYE: CPT

## 2024-12-19 RX ORDER — PANTOPRAZOLE SODIUM 40 MG/1
TABLET, DELAYED RELEASE ORAL
Qty: 90 TABLET | Refills: 1 | Status: SHIPPED | OUTPATIENT
Start: 2024-12-19

## 2024-12-19 NOTE — TELEPHONE ENCOUNTER
Received request via: Pharmacy    Was the patient seen in the last year in this department? Yes    Does the patient have an active prescription (recently filled or refills available) for medication(s) requested? No    Pharmacy Name: [facility]/pharmacy #[id number] - [place], [place] - [address] [place] [facility].     Does the patient have [facility] and need 100-day supply? (This applies to ALL medications) Patient does not have SCP

## 2025-06-02 DIAGNOSIS — F41.9 ANXIETY: Primary | ICD-10-CM

## 2025-06-02 RX ORDER — ALPRAZOLAM 0.5 MG
0.5 TABLET ORAL 3 TIMES DAILY PRN
Qty: 5 TABLET | Refills: 0 | Status: SHIPPED | OUTPATIENT
Start: 2025-06-02 | End: 2025-06-07

## (undated) DEVICE — CANISTER SUCTION RIGID RED 1500CC (40EA/CA)

## (undated) DEVICE — TROCAR Z THREAD 11 X 100 - BLADED (6/BX)

## (undated) DEVICE — LIGASURE 5MM BLUNT TIP LONG - 44CM (6EA/PK)

## (undated) DEVICE — TOWEL STOP TIMEOUT SAFETY FLAG (40EA/CA)

## (undated) DEVICE — SUTURE 4-0 MONOCRYL PLUS PS-2 - 27 INCH (36/BX)

## (undated) DEVICE — SET LEADWIRE 5 LEAD BEDSIDE DISPOSABLE ECG (1SET OF 5/EA)

## (undated) DEVICE — NEEDLE INSFL 120MM 14GA VRRS - (20/BX)

## (undated) DEVICE — MASK OXYGEN VNYL ADLT MED CONC WITH 7 FOOT TUBING  - (50EA/CA)

## (undated) DEVICE — PAD SANITARY 11IN MAXI IND WRAPPED  (12EA/PK 24PK/CA)

## (undated) DEVICE — GLOVE BIOGEL INDICATOR SZ 7SURGICAL PF LTX - (50/BX 4BX/CA)

## (undated) DEVICE — SODIUM CHL IRRIGATION 0.9% 1000ML (12EA/CA)

## (undated) DEVICE — KIT  I.V. START (100EA/CA)

## (undated) DEVICE — SPONGE GAUZESTER. 2X2 4-PL - (2/PK 50PK/BX 30BX/CS)

## (undated) DEVICE — Device

## (undated) DEVICE — SUCTION INSTRUMENT YANKAUER BULBOUS TIP W/O VENT (50EA/CA)

## (undated) DEVICE — DRAPESURG STERI-DRAPE LONG - (10/BX 4BX/CA)

## (undated) DEVICE — SENSOR OXIMETER ADULT SPO2 RD SET (20EA/BX)

## (undated) DEVICE — TUBE CONNECTING SUCTION - CLEAR PLASTIC STERILE 72 IN (50EA/CA)

## (undated) DEVICE — SUTURE 0 VICRYL PLUS CT-2 - 27 INCH (36/BX)

## (undated) DEVICE — DRAPE STRLE REG TOWEL 18X24 - (10/BX 4BX/CA)"

## (undated) DEVICE — SUTURE GENERAL

## (undated) DEVICE — DRESSING TRANSPARENT FILM TEGADERM 2.375 X 2.75"  (100EA/BX)"

## (undated) DEVICE — CANISTER SUCTION 3000ML MECHANICAL FILTER AUTO SHUTOFF MEDI-VAC NONSTERILE LF DISP  (40EA/CA)

## (undated) DEVICE — CANNULA W/ SUPPLY TUBING O2 - (50/CA)

## (undated) DEVICE — TUBING CLEARLINK DUO-VENT - C-FLO (48EA/CA)

## (undated) DEVICE — TROCAR 5X100 BLADED ADVANCE - FIXATION (6/BX)

## (undated) DEVICE — GLOVE SZ 7 BIOGEL PI MICRO - PF LF (50PR/BX 4BX/CA)

## (undated) DEVICE — GOWN WARMING STANDARD FLEX - (30/CA)

## (undated) DEVICE — PAD OR TABLE DA VINCI 2IN X 20IN X 72IN - (12EA/CA)

## (undated) DEVICE — LACTATED RINGERS INJ 1000 ML - (14EA/CA 60CA/PF)

## (undated) DEVICE — SLEEVE VASO CALF MED - (10PR/CA)